# Patient Record
Sex: MALE | Race: WHITE | NOT HISPANIC OR LATINO
[De-identification: names, ages, dates, MRNs, and addresses within clinical notes are randomized per-mention and may not be internally consistent; named-entity substitution may affect disease eponyms.]

---

## 2017-03-22 PROBLEM — Z00.00 ENCOUNTER FOR PREVENTIVE HEALTH EXAMINATION: Status: ACTIVE | Noted: 2017-03-22

## 2017-04-03 ENCOUNTER — APPOINTMENT (OUTPATIENT)
Dept: DERMATOLOGY | Facility: CLINIC | Age: 72
End: 2017-04-03

## 2017-04-10 ENCOUNTER — APPOINTMENT (OUTPATIENT)
Dept: DERMATOLOGY | Facility: CLINIC | Age: 72
End: 2017-04-10

## 2021-02-11 ENCOUNTER — INPATIENT (INPATIENT)
Facility: HOSPITAL | Age: 76
LOS: 4 days | Discharge: NURSING, REHAB OR LONG-TERM CARE | DRG: 314 | End: 2021-02-16
Attending: FAMILY MEDICINE | Admitting: FAMILY MEDICINE
Payer: MEDICARE

## 2021-02-11 VITALS
RESPIRATION RATE: 18 BRPM | DIASTOLIC BLOOD PRESSURE: 63 MMHG | TEMPERATURE: 98 F | HEART RATE: 70 BPM | OXYGEN SATURATION: 96 % | SYSTOLIC BLOOD PRESSURE: 107 MMHG

## 2021-02-11 DIAGNOSIS — I95.9 HYPOTENSION, UNSPECIFIED: ICD-10-CM

## 2021-02-11 DIAGNOSIS — N18.6 END STAGE RENAL DISEASE: ICD-10-CM

## 2021-02-11 DIAGNOSIS — R41.82 ALTERED MENTAL STATUS, UNSPECIFIED: ICD-10-CM

## 2021-02-11 DIAGNOSIS — D64.9 ANEMIA, UNSPECIFIED: ICD-10-CM

## 2021-02-11 DIAGNOSIS — Z86.69 PERSONAL HISTORY OF OTHER DISEASES OF THE NERVOUS SYSTEM AND SENSE ORGANS: ICD-10-CM

## 2021-02-11 DIAGNOSIS — Z85.46 PERSONAL HISTORY OF MALIGNANT NEOPLASM OF PROSTATE: ICD-10-CM

## 2021-02-11 DIAGNOSIS — R77.8 OTHER SPECIFIED ABNORMALITIES OF PLASMA PROTEINS: ICD-10-CM

## 2021-02-11 LAB
ALBUMIN SERPL ELPH-MCNC: 2.7 G/DL — LOW (ref 3.3–5.2)
ALP SERPL-CCNC: 87 U/L — SIGNIFICANT CHANGE UP (ref 40–120)
ALT FLD-CCNC: 10 U/L — SIGNIFICANT CHANGE UP
ANION GAP SERPL CALC-SCNC: 18 MMOL/L — HIGH (ref 5–17)
APPEARANCE CSF: CLEAR — SIGNIFICANT CHANGE UP
APPEARANCE SPUN FLD: COLORLESS — SIGNIFICANT CHANGE UP
APTT BLD: 31.2 SEC — SIGNIFICANT CHANGE UP (ref 27.5–35.5)
AST SERPL-CCNC: 17 U/L — SIGNIFICANT CHANGE UP
BASOPHILS # BLD AUTO: 0.03 K/UL — SIGNIFICANT CHANGE UP (ref 0–0.2)
BASOPHILS NFR BLD AUTO: 0.4 % — SIGNIFICANT CHANGE UP (ref 0–2)
BILIRUB SERPL-MCNC: 0.4 MG/DL — SIGNIFICANT CHANGE UP (ref 0.4–2)
BUN SERPL-MCNC: 26 MG/DL — HIGH (ref 8–20)
CALCIUM SERPL-MCNC: 8.6 MG/DL — SIGNIFICANT CHANGE UP (ref 8.6–10.2)
CHLORIDE SERPL-SCNC: 92 MMOL/L — LOW (ref 98–107)
CO2 SERPL-SCNC: 23 MMOL/L — SIGNIFICANT CHANGE UP (ref 22–29)
COLOR CSF: SIGNIFICANT CHANGE UP
CREAT SERPL-MCNC: 4.44 MG/DL — HIGH (ref 0.5–1.3)
EOSINOPHIL # BLD AUTO: 0.03 K/UL — SIGNIFICANT CHANGE UP (ref 0–0.5)
EOSINOPHIL NFR BLD AUTO: 0.4 % — SIGNIFICANT CHANGE UP (ref 0–6)
GLUCOSE CSF-MCNC: 52 MG/DLG/24H — SIGNIFICANT CHANGE UP (ref 40–70)
GLUCOSE SERPL-MCNC: 99 MG/DL — SIGNIFICANT CHANGE UP (ref 70–99)
GRAM STN FLD: SIGNIFICANT CHANGE UP
HCT VFR BLD CALC: 23.7 % — LOW (ref 39–50)
HGB BLD-MCNC: 8.2 G/DL — LOW (ref 13–17)
IMM GRANULOCYTES NFR BLD AUTO: 0.6 % — SIGNIFICANT CHANGE UP (ref 0–1.5)
INR BLD: 1.05 RATIO — SIGNIFICANT CHANGE UP (ref 0.88–1.16)
LYMPHOCYTES # BLD AUTO: 2.55 K/UL — SIGNIFICANT CHANGE UP (ref 1–3.3)
LYMPHOCYTES # BLD AUTO: 30.5 % — SIGNIFICANT CHANGE UP (ref 13–44)
MCHC RBC-ENTMCNC: 32.3 PG — SIGNIFICANT CHANGE UP (ref 27–34)
MCHC RBC-ENTMCNC: 34.6 GM/DL — SIGNIFICANT CHANGE UP (ref 32–36)
MCV RBC AUTO: 93.3 FL — SIGNIFICANT CHANGE UP (ref 80–100)
MONOCYTES # BLD AUTO: 1.18 K/UL — HIGH (ref 0–0.9)
MONOCYTES NFR BLD AUTO: 14.1 % — HIGH (ref 2–14)
NEUTROPHILS # BLD AUTO: 4.51 K/UL — SIGNIFICANT CHANGE UP (ref 1.8–7.4)
NEUTROPHILS # CSF: SIGNIFICANT CHANGE UP % (ref 0–6)
NEUTROPHILS NFR BLD AUTO: 54 % — SIGNIFICANT CHANGE UP (ref 43–77)
NRBC NFR CSF: 2 /UL — SIGNIFICANT CHANGE UP (ref 0–5)
PLATELET # BLD AUTO: 326 K/UL — SIGNIFICANT CHANGE UP (ref 150–400)
POTASSIUM SERPL-MCNC: 4 MMOL/L — SIGNIFICANT CHANGE UP (ref 3.5–5.3)
POTASSIUM SERPL-SCNC: 4 MMOL/L — SIGNIFICANT CHANGE UP (ref 3.5–5.3)
PROT CSF-MCNC: 24 MG/DL — SIGNIFICANT CHANGE UP (ref 15–45)
PROT SERPL-MCNC: 5.9 G/DL — LOW (ref 6.6–8.7)
PROTHROM AB SERPL-ACNC: 12.2 SEC — SIGNIFICANT CHANGE UP (ref 10.6–13.6)
RAPID RVP RESULT: SIGNIFICANT CHANGE UP
RBC # BLD: 2.54 M/UL — LOW (ref 4.2–5.8)
RBC # CSF: 2 /CMM — HIGH (ref 0–1)
RBC # FLD: 15.3 % — HIGH (ref 10.3–14.5)
SARS-COV-2 RNA SPEC QL NAA+PROBE: SIGNIFICANT CHANGE UP
SODIUM SERPL-SCNC: 133 MMOL/L — LOW (ref 135–145)
SPECIMEN SOURCE: SIGNIFICANT CHANGE UP
TROPONIN T SERPL-MCNC: 0.08 NG/ML — HIGH (ref 0–0.06)
TUBE TYPE: SIGNIFICANT CHANGE UP
WBC # BLD: 8.35 K/UL — SIGNIFICANT CHANGE UP (ref 3.8–10.5)
WBC # FLD AUTO: 8.35 K/UL — SIGNIFICANT CHANGE UP (ref 3.8–10.5)

## 2021-02-11 PROCEDURE — 70496 CT ANGIOGRAPHY HEAD: CPT | Mod: 26

## 2021-02-11 PROCEDURE — 70498 CT ANGIOGRAPHY NECK: CPT | Mod: 26

## 2021-02-11 PROCEDURE — 93010 ELECTROCARDIOGRAM REPORT: CPT

## 2021-02-11 PROCEDURE — 71045 X-RAY EXAM CHEST 1 VIEW: CPT | Mod: 26

## 2021-02-11 PROCEDURE — 0042T: CPT

## 2021-02-11 PROCEDURE — 99291 CRITICAL CARE FIRST HOUR: CPT

## 2021-02-11 PROCEDURE — 99223 1ST HOSP IP/OBS HIGH 75: CPT

## 2021-02-11 RX ORDER — SEVELAMER CARBONATE 2400 MG/1
1 POWDER, FOR SUSPENSION ORAL
Qty: 0 | Refills: 0 | DISCHARGE

## 2021-02-11 RX ORDER — ALTEPLASE 100 MG
61.6 KIT INTRAVENOUS ONCE
Refills: 0 | Status: DISCONTINUED | OUTPATIENT
Start: 2021-02-11 | End: 2021-02-11

## 2021-02-11 RX ORDER — DRONABINOL 2.5 MG
0 CAPSULE ORAL
Qty: 0 | Refills: 0 | DISCHARGE

## 2021-02-11 RX ORDER — ASPIRIN/CALCIUM CARB/MAGNESIUM 324 MG
300 TABLET ORAL ONCE
Refills: 0 | Status: COMPLETED | OUTPATIENT
Start: 2021-02-11 | End: 2021-02-11

## 2021-02-11 RX ORDER — ACYCLOVIR SODIUM 500 MG
500 VIAL (EA) INTRAVENOUS ONCE
Refills: 0 | Status: COMPLETED | OUTPATIENT
Start: 2021-02-11 | End: 2021-02-11

## 2021-02-11 RX ORDER — VANCOMYCIN HCL 1 G
1000 VIAL (EA) INTRAVENOUS ONCE
Refills: 0 | Status: DISCONTINUED | OUTPATIENT
Start: 2021-02-11 | End: 2021-02-12

## 2021-02-11 RX ORDER — CEFTRIAXONE 500 MG/1
2000 INJECTION, POWDER, FOR SOLUTION INTRAMUSCULAR; INTRAVENOUS ONCE
Refills: 0 | Status: COMPLETED | OUTPATIENT
Start: 2021-02-11 | End: 2021-02-11

## 2021-02-11 RX ORDER — HALOPERIDOL DECANOATE 100 MG/ML
5 INJECTION INTRAMUSCULAR ONCE
Refills: 0 | Status: COMPLETED | OUTPATIENT
Start: 2021-02-11 | End: 2021-02-11

## 2021-02-11 RX ORDER — CEFTRIAXONE 500 MG/1
2000 INJECTION, POWDER, FOR SOLUTION INTRAMUSCULAR; INTRAVENOUS EVERY 12 HOURS
Refills: 0 | Status: DISCONTINUED | OUTPATIENT
Start: 2021-02-11 | End: 2021-02-11

## 2021-02-11 RX ORDER — ALTEPLASE 100 MG
6.8 KIT INTRAVENOUS ONCE
Refills: 0 | Status: DISCONTINUED | OUTPATIENT
Start: 2021-02-11 | End: 2021-02-11

## 2021-02-11 RX ORDER — HEPARIN SODIUM 5000 [USP'U]/ML
5000 INJECTION INTRAVENOUS; SUBCUTANEOUS EVERY 12 HOURS
Refills: 0 | Status: DISCONTINUED | OUTPATIENT
Start: 2021-02-12 | End: 2021-02-16

## 2021-02-11 RX ADMIN — Medication 300 MILLIGRAM(S): at 23:59

## 2021-02-11 RX ADMIN — HALOPERIDOL DECANOATE 5 MILLIGRAM(S): 100 INJECTION INTRAMUSCULAR at 19:16

## 2021-02-11 RX ADMIN — CEFTRIAXONE 100 MILLIGRAM(S): 500 INJECTION, POWDER, FOR SOLUTION INTRAMUSCULAR; INTRAVENOUS at 19:41

## 2021-02-11 RX ADMIN — Medication 110 MILLIGRAM(S): at 23:02

## 2021-02-11 RX ADMIN — Medication 2 MILLIGRAM(S): at 16:28

## 2021-02-11 RX ADMIN — Medication 1 DROP(S): at 23:02

## 2021-02-11 RX ADMIN — Medication 2 MILLIGRAM(S): at 19:16

## 2021-02-11 NOTE — H&P ADULT - PROBLEM SELECTOR PLAN 4
No old labs for comparison, Hb 8.2 , likely related to his CKD, will send anemia work up and close f/u of labs. trop 0.08 possible renally related due to elevated Cr, but will trend, echo and cardiology [place]. trop 0.08 possible renally related due to elevated Cr, but will trend, echo and cardiology consult [place] called.

## 2021-02-11 NOTE — H&P ADULT - PROBLEM SELECTOR PLAN 1
Etiology ? TIA ? will admit to stroke unit for now, stroke protocol, neuro checks, mri brain, echo, carotid doppler. neurology follow up. npo at this point as pt. is sedated and was altered. swallow eval.

## 2021-02-11 NOTE — CONSULT NOTE ADULT - SUBJECTIVE AND OBJECTIVE BOX
[facility]                                     Neurology at [place]                                 Tony Crump, & Gigi                                  [address]                                        [place], [place], [address]                                             [phone]    CC: AMS  HPI: The patient is a 75y Male who presented with hypotension and  AMS following hemodialysis this afternoon.  During transport to [facility], EMS noticed a left facial droop and code stroke was activated. Estimated last know well is approximately 1600.  Upon speaking with his wife, Dr Ram in The ED found out that he has had this facial droop since December when he had [place] palsy.  He has dementia and needed ativan for the CT head.  Neurology is asked to     PAST MEDICAL & SURGICAL HISTORY:  dementias  ESRD on HD    MEDICATIONS  (STANDING):    MEDICATIONS  (PRN):      Allergies    No Known Allergies    Intolerances        SOCIAL HISTORY:  unable to obtain    FAMILY HISTORY:  unable to obtain      ROS: 14 point ROS negative other than what is present in HPI or below   dementia, sedated lightly    Vital Signs Last 24 Hrs  T(C): 36.8 (11 Feb 2021 16:08), Max: 36.8 (11 Feb 2021 16:08)  T(F): 98.2 (11 Feb 2021 16:08), Max: 98.2 (11 Feb 2021 16:08)  HR: 70 (11 Feb 2021 16:08) (70 - 70)  BP: 107/63 (11 Feb 2021 16:08) (107/63 - 107/63)  BP(mean): --  RR: 18 (11 Feb 2021 16:08) (18 - 18)  SpO2: 96% (11 Feb 2021 16:08) (96% - 96%)      General: NAD    Detailed Neurologic Exam:    Mental status: The patient is sedated, non verbal, not following commands.  unable to assess orientation.     Cranial nerves: Pupils equal and react symmetrically to light. There is no visual field deficit to threat. Extraocular motion is full with tracking. There is no ptosis. Facial sensation is intact. Facial musculature is asymmetric, peripheral left facial weakness.    Motor: There is normal bulk and increased tone.  There is no tremor. He is restless and grabbning at things.  not participating in formal strength testing but moves 4 ext equally well      Sensation: Intact to pinch in 4 extremities    Reflexes: 1+ throughout and plantar responses are flexor.    Cerebellar: Unable to assess finger to nose testing.    Gait : deferred    NIH SS: artificially elevated due to dementia and sedation  DATE: 2/11/2021  TIME: 1700  1A: Level of consciousness (0-3): 0  1B: Questions (0-2): 2  1C: Commands (0-2): 2  2: Gaze (0-2): 0  3: Visual fields (0-3): 0  4: Facial palsy (0-3): 1  MOTOR:  5A: Left arm motor drift (0-4): 1  5B: Right arm motor drift (0-4): 1  6A: Left leg motor drift (0-4): 1  6B: Right leg motor drift (0-4): 1  7: Limb ataxia (0-2): 0  SENSORY:  8: Sensation (0-2): 0  SPEECH:  9: Language (0-3): 2  10: Dysarthria (0-2): 1  EXTINCTION:  11: Extinction/inattention (0-2): 0    TOTAL SCORE: 12          LABS:                         8.2    8.35  )-----------( 326      ( 11 Feb 2021 16:24 )             23.7       02-11    133<L>  |  92<L>  |  26.0<H>  ----------------------------<  99  4.0   |  23.0  |  4.44<H>    Ca    8.6      11 Feb 2021 16:24    TPro  5.9<L>  /  Alb  2.7<L>  /  TBili  0.4  /  DBili  x   /  AST  17  /  ALT  10  /  AlkPhos  87  02-11      PT/INR - ( 11 Feb 2021 16:24 )   PT: 12.2 sec;   INR: 1.05 ratio         PTT - ( 11 Feb 2021 16:24 )  PTT:31.2 sec    RADIOLOGY & ADDITIONAL STUDIES (independently reviewed unless otherwise noted):  CT head: no acute CVA, mass or blood  CTA head: motion artifact, poor contrast timing non diagnostic  CTA neck: no sig carotid or vertebral stenosis but motion degraded  CT Perfusion head - CBF<30% volume 0ml, Tmax>6s volume =0ml

## 2021-02-11 NOTE — H&P ADULT - PROBLEM SELECTOR PLAN 3
pt's nephrologist Dr. Carter service called as pt. got iv contrast and will need dialysis, dialysis plan tonight or tomorrow as per nephrologist.

## 2021-02-11 NOTE — H&P ADULT - PROBLEM SELECTOR PLAN 5
At baseline per history, will order artifical tear for his left eye. No old labs for comparison, Hb 8.2 , likely related to his CKD, will send anemia work up and close f/u of labs.

## 2021-02-11 NOTE — ED PROVIDER NOTE - PHYSICAL EXAMINATION
General: well appearing, not following commands  HEENT: left eye lid open, left side facial droop, pupils equal and reactive, normal external ears bilaterally   Cardiac: RRR, no MRG appreciated  Resp: lungs clear to auscultation bilaterally, symmetric chest wall rise  Abd: soft, nontender, nondistended,   : no CVA tenderness  Neuro: Moving all extremities  Skin:  normal color for race

## 2021-02-11 NOTE — ED ADULT TRIAGE NOTE - CHIEF COMPLAINT QUOTE
Patient BIBA to ED today after receiving hemodialysis he started to have right sided facial droop, patient uncooperative, unable to follow commands.  Dr. Ram at bedside, code stroke called.

## 2021-02-11 NOTE — ED PROVIDER NOTE - OBJECTIVE STATEMENT
Pt is a 74 y/o s/p dialysis presents c/o code stroke.  Left side facial droop. Pt is a 74 y/o s/p dialysis presents c/o code stroke.  Left side facial droop.  Pt was BIBEMS c/o hypotension and AMS after completing dialysis.  Enroute EMS noticed a left sided facial droop.  At time of presentation pt was not able to answer questions, stroke team activated.  Per wife, pt has hx of [place] Palsy since December.  Was discharged from [facility] after a week stay c/o weakness and not eating for a month.  She states that he seems to be continually declining.  He completed dialysis today and she states that she was unable to recognize her.

## 2021-02-11 NOTE — H&P ADULT - HISTORY OF PRESENT ILLNESS
pt. is a 75y Male who is on HD as of November 2020 and also got renal stents b/L during that period by Dr. Crabtree ( as per pt's wife ) presented with hypotension and  AMS following hemodialysis this afternoon. Pt. completed his dialysis and was waiting to be picked up by his wife when his symptoms of confusion and hypotension developed. During transport to [facility], EMS noticed a left facial droop and code stroke was activated. Later ER physician spoke to pt's wife who stated that pt. had Bell's palsy in December 2020 and his facial droop is from that and not a new finding. I have spoke to pt's wife as well who is at the bedside at the time of admission and told me same thing about his facial droop as well. Pt. was evaluated by neurologist Dr. Bonilla in the ER who thinks it is likely not a stroke and no alteplase was recommended. As per pt's wife he was discharged on Feb, 9th , 2021 from [facility] after he was admitted there for 1 week for generalized weakness and decreased po intake. pt. was discharged with Dronabinol. Pt. has been covid -19 positive. no sick person at home. no fever at home, pt. afebrile in the ED as well. pt. was sedated to get his ct head, got iv contrast and later got LP by ER team and was sedated with ativan and haldol. Pt. sedated at the time of admission. Pt. has not been giving much history as per ER team. There has been no n/v/d. cp, sob per history.

## 2021-02-11 NOTE — ED ADULT NURSE REASSESSMENT NOTE - NS ED NURSE REASSESS COMMENT FT1
assumed pt care from critical care RN. Pt at baseline as per wife at bedside. Pt in NAD, respirations even & unlabored. NSR on monitor. Safety maintained. WIll continue to monitor.
unable to do full neuro exam as pt is unable to participate.

## 2021-02-11 NOTE — H&P ADULT - NSHPPHYSICALEXAM_GEN_ALL_CORE
General: Pt. lying in bed , somewhat sedated at the time of admission but periodically moves his upper / lower ext.   HEENT: AT, NC. PERRL. left eye patch noted, pt. has that one since he got bells palsy and gets dry eye. no throat erythema or exudate.   Neck: supple. no JVD.   Chest: CTA bilaterally, rt. upper CW dialysis catheter noted, intact / clean.   Heart: S1,S2. RRR. no heart murmur. no edema.   Abdomen: soft. does not appear in pain on abd. exam. non-distended. + BS.   Ext: no calf swelling on either side noted. distal pulses 2 +.   Neuro: pt. is somewhat sedated at the time of admission ( got haldol and ativan for LP ) but periodically moves his upper / lower ext. limited exam at this point due to sedation.  Skin: no rash noted, no diaphoresis, no cyanosis.   Psych : no agitation, pt. sedated. General: Pt. lying in bed , somewhat sedated at the time of admission but periodically moves his upper / lower ext.   HEENT: AT, NC. PERRL. left eye patch noted, pt. has that one since he got bells palsy and gets dry eye. left facial droop, no throat erythema or exudate.   Neck: supple. no JVD.   Chest: CTA bilaterally, rt. upper CW dialysis catheter noted, intact / clean.   Heart: S1,S2. RRR. no heart murmur. no edema.   Abdomen: soft. does not appear in pain on abd. exam. non-distended. + BS.   Ext: no calf swelling on either side noted. distal pulses 2 +.   Neuro: pt. is somewhat sedated at the time of admission ( got haldol and ativan for LP ) but periodically moves his upper / lower ext. limited exam at this point due to sedation. Motor: There is normal bulk and increased tone.  There is no tremor. Sensation: Intact to pinch in 4 extremities. Reflexes: 1+ throughout and plantar responses are flexor. Cerebellar: Unable to assess finger to nose testing.  Skin: no rash noted, no diaphoresis, no cyanosis.   Psych : no agitation, pt. sedated.

## 2021-02-11 NOTE — H&P ADULT - ASSESSMENT
In summary pt. is a 75y Male who is on HD as of November 2020 and also got renal stents b/L during that period by Dr. Crabtree ( as per pt's wife ) presented with hypotension and  AMS following hemodialysis this afternoon. Pt. completed his dialysis and was waiting to be picked up by his wife when his symptoms of confusion and hypotension developed. During transport to [facility], EMS noticed a left facial droop and code stroke was activated. Later ER physician spoke to pt's wife who stated that pt. had Bell's palsy in December 2020 and his facial droop is from that and not a new finding. I have spoke to pt's wife as well who is at the bedside at the time of admission and told me same thing about his facial droop as well. Pt. was evaluated by neurologist Dr. Bonilla in the ER who thinks it is likely not a stroke and no alteplase was recommended. As per pt's wife he was discharged on Feb, 9th , 2021 from [facility] after he was admitted there for 1 week for generalized weakness and decreased po intake. pt. was discharged with Dronabinol. Pt. has been covid -19 positive. no sick person at home. no fever at home, pt. afebrile in the ED as well. pt. was sedated to get his ct head, got iv contrast and later got LP by ER team and was sedated with ativan and haldol. Pt. sedated at the time of admission. Pt. has not been giving much history as per ER team. There has been no n/v/d. cp, sob per history. pt. will be admitted for AMS, TIA ? will be admit to stroke unit , stroke work up including MR brain, pt. is not hypotensive in the ER.

## 2021-02-11 NOTE — H&P ADULT - NSICDXPASTMEDICALHX_GEN_ALL_CORE_FT
PAST MEDICAL HISTORY:  Chronic kidney disease (CKD) on HD as of Novemeber, 2020. had  b/L renal stents in nov, 2020 .    H/O Bell's palsy     Prostate CA as per wife had radiation treatment in 2018.

## 2021-02-11 NOTE — ED PROVIDER NOTE - ATTENDING CONTRIBUTION TO CARE
The patient seen and examined    AMS    I, Davon Ram, performed the initial face to face bedside interview with this patient regarding history of present illness, review of symptoms and relevant past medical, social and family history.  I completed an independent physical examination.  I was the initial provider who evaluated this patient. I have signed out the follow up of any pending tests (i.e. labs, radiological studies) to the resident.  I have communicated the patient’s plan of care and disposition with the resident.

## 2021-02-11 NOTE — H&P ADULT - PROBLEM SELECTOR PLAN 7
pt. had radiation in 2018 as per wife. PT'S CT NECK REPORTED 1.4 CM RT. LUNG  NODULAR OPACITY FOR FURTHER EVALUATION CT CHEST ORDERED TO BE FOLLOWED.

## 2021-02-11 NOTE — CONSULT NOTE ADULT - SUBJECTIVE AND OBJECTIVE BOX
75yM was admitted on 02-11    In ED, GCS=    Patient is a 75y old  Male who presents with a chief complaint of AMS (11 Feb 2021 22:09)    HPI:  pt. is a 75y Male who is on HD as of November 2020 and also got renal stents b/L during that period by Dr. Crabtree ( as per pt's wife ) presented with hypotension and  AMS following hemodialysis this afternoon. Pt. completed his dialysis and was waiting to be picked up by his wife when his symptoms of confusion and hypotension developed. During transport to [facility], EMS noticed a left facial droop and code stroke was activated. Later ER physician spoke to pt's wife who stated that pt. had Bell's palsy in December 2020 and his facial droop is from that and not a new finding. I have spoke to pt's wife as well who is at the bedside at the time of admission and told me same thing about his facial droop as well. Pt. was evaluated by neurologist Dr. Bonilla in the ER who thinks it is likely not a stroke and no alteplase was recommended. As per pt's wife he was discharged on Feb, 9th , 2021 from [facility] after he was admitted there for 1 week for generalized weakness and decreased po intake. pt. was discharged with Dronabinol. Pt. has been covid -19 positive. no sick person at home. no fever at home, pt. afebrile in the ED as well. pt. was sedated to get his ct head, got iv contrast and later got LP by ER team and was sedated with ativan and haldol. Pt. sedated at the time of admission. Pt. has not been giving much history as per ER team. There has been no n/v/d. cp, sob per history.   (11 Feb 2021 19:17)      Imaging showed:  Brain CT without contrast 2/11:  Motion degraded exam. No evidence of intracranial hemorrhage or mass effect. If clinical suspicion for acute infarct persists, brain MRI can be obtained if there is no contraindication.    CT perfusion 2/11:  No evidence of core infarct.    CT angiography neck 2/11:  1.  Very limited, motion degraded exam. Portions of the bilateral vertebral arteries and ICAs are not well visualized due to artifact. No gross evidence of vascular occlusion. No evidence of hemodynamically significant stenosis of the bilateral cervical ICAs using NASCET criteria.  2.  1.4 cm nodular opacity in the right lung. Recommend CT chest.    CT angiography brain 2/11: Nondiagnostic due to motion.      REVIEW OF SYSTEMS  Constitutional - No fever, No weight loss, No fatigue  HEENT - No eye pain, No visual disturbances, No difficulty hearing, No tinnitus, No vertigo, No neck pain  Respiratory - No cough, No wheezing, No shortness of breath  Cardiovascular - No chest pain, No palpitations  Gastrointestinal - No abdominal pain, No nausea, No vomiting, No diarrhea, No constipation  Genitourinary - No dysuria, No frequency, No hematuria, No incontinence  Neurological - No headaches, No memory loss, No loss of strength, No numbness, No tremors  Skin - No itching, No rashes, No lesions   Endocrine - No temperature intolerance  Musculoskeletal - No joint pain, No joint swelling, No muscle pain  Psychiatric - No depression, No anxiety    VITALS  T(C): 36.7 (02-11-21 @ 18:02), Max: 36.8 (02-11-21 @ 16:08)  HR: 91 (02-11-21 @ 19:34) (70 - 96)  BP: 149/93 (02-11-21 @ 19:34) (107/63 - 149/93)  RR: 16 (02-11-21 @ 19:34) (16 - 20)  SpO2: 100% (02-11-21 @ 19:34) (96% - 100%)  Wt(kg): --    PAST MEDICAL & SURGICAL HISTORY  Prostate CA    H/O Bell&#x27;s palsy    Chronic kidney disease (CKD)    No significant past surgical history        SOCIAL HISTORY - as per documentation/history  Smoking - None  EtOH - None  Drugs - None    FUNCTIONAL HISTORY  Lives with wife  Independent PTA    CURRENT FUNCTIONAL STATUS      FAMILY HISTORY   FH: breast cancer        RECENT LABS/IMAGING  CBC Full  -  ( 11 Feb 2021 16:24 )  WBC Count : 8.35 K/uL  RBC Count : 2.54 M/uL  Hemoglobin : 8.2 g/dL  Hematocrit : 23.7 %  Platelet Count - Automated : 326 K/uL  Mean Cell Volume : 93.3 fl  Mean Cell Hemoglobin : 32.3 pg  Mean Cell Hemoglobin Concentration : 34.6 gm/dL  Auto Neutrophil # : 4.51 K/uL  Auto Lymphocyte # : 2.55 K/uL  Auto Monocyte # : 1.18 K/uL  Auto Eosinophil # : 0.03 K/uL  Auto Basophil # : 0.03 K/uL  Auto Neutrophil % : 54.0 %  Auto Lymphocyte % : 30.5 %  Auto Monocyte % : 14.1 %  Auto Eosinophil % : 0.4 %  Auto Basophil % : 0.4 %    02-11    133<L>  |  92<L>  |  26.0<H>  ----------------------------<  99  4.0   |  23.0  |  4.44<H>    Ca    8.6      11 Feb 2021 16:24    TPro  5.9<L>  /  Alb  2.7<L>  /  TBili  0.4  /  DBili  x   /  AST  17  /  ALT  10  /  AlkPhos  87  02-11        ALLERGIES  No Known Allergies      MEDICATIONS   acyclovir IVPB 500 milliGRAM(s) IV Intermittent once  artificial tears (preservative free) Ophthalmic Solution 1 Drop(s) Left EYE four times a day  aspirin Suppository 300 milliGRAM(s) Rectal once  vancomycin  IVPB 1000 milliGRAM(s) IV Intermittent once       75yM was admitted on 02-11 with AMS. He was recently discharged from an OSH for weakness and decrease PO.      Imaging showed:  Brain CT 2/11 - Motion degraded exam. No evidence of intracranial hemorrhage or mass effect. If clinical suspicion for acute infarct persists, brain MRI can be obtained if there is no contraindication.    CT perfusion 2/11 - No evidence of core infarct.    CTA neck 2/11 1.  Very limited, motion degraded exam. Portions of the bilateral vertebral arteries and ICAs are not well visualized due to artifact. No gross evidence of vascular occlusion. No evidence of hemodynamically significant stenosis of the bilateral cervical ICAs using NASCET criteria. 2.  1.4 cm nodular opacity in the right lung. Recommend CT chest.    CTA brain 2/11 - Nondiagnostic due to motion.    CAROTID US 2/12 - Moderate plaque without a hemodynamic abnormality.    Patient states he feels fine and he feels he is at his baseline.   He reports no pain.     REVIEW OF SYSTEMS  Constitutional - No fever, No weight loss, No fatigue  HEENT - No eye pain, No visual disturbances, No difficulty hearing, No tinnitus, No vertigo, No neck pain, +Left facial droop  Respiratory - No cough, No wheezing, No shortness of breath  Cardiovascular - No chest pain, No palpitations  Gastrointestinal - No abdominal pain, No nausea, No vomiting, No diarrhea, No constipation  Genitourinary - No dysuria, No frequency, No hematuria, No incontinence  Neurological - No headaches, No memory loss, No loss of strength, No numbness, No tremors  Skin - No itching, No rashes, No lesions   Endocrine - No temperature intolerance  Musculoskeletal - No joint pain, No joint swelling, No muscle pain  Psychiatric - No depression, No anxiety    VITALS  T(C): 36.7 (02-11-21 @ 18:02), Max: 36.8 (02-11-21 @ 16:08)  HR: 91 (02-11-21 @ 19:34) (70 - 96)  BP: 149/93 (02-11-21 @ 19:34) (107/63 - 149/93)  RR: 16 (02-11-21 @ 19:34) (16 - 20)  SpO2: 100% (02-11-21 @ 19:34) (96% - 100%)  Wt(kg): --    PAST MEDICAL & SURGICAL HISTORY  Prostate CA    H/O Bell&#x27;s palsy    Chronic kidney disease (CKD)    No significant past surgical history        SOCIAL HISTORY - as per documentation/history  Smoking - None  EtOH - None  Drugs - None    FUNCTIONAL HISTORY  Lives with wife  Independent PTA    CURRENT FUNCTIONAL STATUS  2/12  Bed Mobility: Rolling/Turning:     · Level of [place]	minimum assist (75% patients effort)  · Physical Assist/Nonphysical Assist	1 person assist    Bed Mobility: Scooting/Bridging:     · Level of [place]	minimum assist (75% patients effort)  · Physical Assist/Nonphysical Assist	1 person assist    Bed Mobility: Sit to Supine:     · Level of [place]	minimum assist (75% patients effort)  · Physical Assist/Nonphysical Assist	1 person assist    Bed Mobility: Supine to Sit:     · Level of [place]	moderate assist (50% patients effort)  · Physical Assist/Nonphysical Assist	1 person assist    Bed Mobility Analysis:     · Bed Mobility Limitations	impaired ability to control trunk for mobility; decreased ability to use legs for bridging/pushing  · Impairments Contributing to Impaired Bed Mobility	impaired balance; impaired postural control; decreased strength; lethargy    Transfer: Sit to Stand:     · Level of [place]	minimum assist (75% patients effort)  · Physical Assist/Nonphysical Assist	1 person assist  · Weight-Bearing Restrictions	weight-bearing as tolerated  · Assistive Device	rolling walker    Transfer: Stand to Sit:     · Level of [place]	minimum assist (75% patients effort)  · Physical Assist/Nonphysical Assist	1 person assist  · Weight-Bearing Restrictions	weight-bearing as tolerated  · Assistive Device	rolling walker    Sit/Stand Transfer Safety Analysis:     · Transfer Safety Concerns Noted	decreased safety awareness; losing balance; decreased weight-shifting ability; decreased sequencing ability  · Impairments Contributing to Impaired Transfers	impaired balance; impaired postural control; decreased strength; impaired coordination; cognition    Gait Skills:     · Level of [place]	minimum assist (75% patients effort)  · Physical Assist/Nonphysical Assist	1 person assist  · Weight-Bearing Restrictions	weight-bearing as tolerated  · Assistive Device	rolling walker  · Gait Distance	3 side steps to left at EOB    Gait Analysis:     · Gait Pattern Used	3-point gait  · Gait Deviations Noted	decreased nick; increased time in double stance; decreased step length; decreased weight-shifting ability  · Impairments Contributing to Gait Deviations	impaired balance; impaired coordination; impaired postural control; decreased strength; cognition    Stair Negotiation:     · Level of [place]	unable to perform; unsafe at this time      FAMILY HISTORY   FH: breast cancer        RECENT LABS/IMAGING  CBC Full  -  ( 11 Feb 2021 16:24 )  WBC Count : 8.35 K/uL  RBC Count : 2.54 M/uL  Hemoglobin : 8.2 g/dL  Hematocrit : 23.7 %  Platelet Count - Automated : 326 K/uL  Mean Cell Volume : 93.3 fl  Mean Cell Hemoglobin : 32.3 pg  Mean Cell Hemoglobin Concentration : 34.6 gm/dL  Auto Neutrophil # : 4.51 K/uL  Auto Lymphocyte # : 2.55 K/uL  Auto Monocyte # : 1.18 K/uL  Auto Eosinophil # : 0.03 K/uL  Auto Basophil # : 0.03 K/uL  Auto Neutrophil % : 54.0 %  Auto Lymphocyte % : 30.5 %  Auto Monocyte % : 14.1 %  Auto Eosinophil % : 0.4 %  Auto Basophil % : 0.4 %    02-11    133<L>  |  92<L>  |  26.0<H>  ----------------------------<  99  4.0   |  23.0  |  4.44<H>    Ca    8.6      11 Feb 2021 16:24    TPro  5.9<L>  /  Alb  2.7<L>  /  TBili  0.4  /  DBili  x   /  AST  17  /  ALT  10  /  AlkPhos  87  02-11        ALLERGIES  No Known Allergies      MEDICATIONS   acyclovir IVPB 500 milliGRAM(s) IV Intermittent once  artificial tears (preservative free) Ophthalmic Solution 1 Drop(s) Left EYE four times a day  aspirin Suppository 300 milliGRAM(s) Rectal once  vancomycin  IVPB 1000 milliGRAM(s) IV Intermittent once        ----------------------------------------------------------------------------------------  PHYSICAL EXAM  Constitutional - NAD, Comfortable  HEENT - NCAT, Left eye patch  Neck - Supple, No limited ROM  Chest - Breathing comfortably, No wheezing  Cardiovascular - S1S2   Abdomen - Soft   Extremities - No C/C/E, No calf tenderness   Neurologic Exam -                    Cognitive - Awake, Alert, AAO to self, NOT situation      Communication - +dysarthria     Cranial Nerves - Left peripheral CN 7      Motor - No focal deficits                    LEFT    UE - ShAB 5/5, EF 5/5, EE 5/5, WE 5/5,  5/5                    RIGHT UE - ShAB 5/5, EF 5/5, EE 5/5, WE 5/5,  5/5                    LEFT    LE - HF 5/5, KE 5/5, DF 5/5, PF 5/5                    RIGHT LE - HF 5/5, KE 5/5, DF 5/5, PF 5/5        Sensory - Intact to LT  Psychiatric - Mood stable, Affect Flat  ----------------------------------------------------------------------------------------    ASSESSMENT/PLAN  75yMale with functional deficits after AMS  R/O CVA - MRI pending  HTN - Hydralazine  DVT PPX - SCDs, Heparin  Rehab - Workup pending. Will continue to follow.     Continue bedside therapy as well as OOB throughout the day with mobilization by staff to maintain cardiopulmonary function and prevention of secondary complications related to debility.     Discussed with rehab team.

## 2021-02-11 NOTE — CONSULT NOTE ADULT - SUBJECTIVE AND OBJECTIVE BOX
[facility]                                                        Office: [address], [place] [address]                                                       Telephone: [phone]. Fax:[phone]                                                              CARDIOLOGY CONSULTATION NOTE                                                                                                 History obtained by: Patient and medical record     obtained: No    Chief complaint: AMS    HPI: 75y Male who is on HD as of November 2020 and also got renal stents b/L during that period by Dr. Crabtree ( as per pt's wife ) presented with hypotension and  AMS following hemodialysis this afternoon. Pt. completed his dialysis and was waiting to be picked up by his wife when his symptoms of confusion and hypotension developed. During transport to [facility], EMS noticed a left facial droop and code stroke was activated. Later ER physician spoke to pt's wife who stated that pt. had Bell's palsy in December 2020 and his facial droop is from that and not a new finding. I have spoke to pt's wife as well who is at the bedside at the time of admission and told me same thing about his facial droop as well. Pt. was evaluated by neurologist Dr. Bonilla in the ER who thinks it is likely not a stroke and no alteplase was recommended. As per pt's wife he was discharged on Feb, 9th , 2021 from [facility] after he was admitted there for 1 week for generalized weakness and decreased po intake. pt. was discharged with Dronabinol. Pt. has been covid -19 positive. no sick person at home. no fever at home, pt. afebrile in the ED as well. pt. was sedated to get his ct head, got iv contrast and later got LP by ER team and was sedated with ativan and haldol. Pt. sedated at the time of admission. Pt. has not been giving much history as per ER team. There has been no n/v/d. cp, sob per history.    REVIEW OF SYMPTOMS: Cardiovascular:  See HPI. No chest pain,  No dyspnea,  No syncope,  No palpitations, No dizziness, No Orthopnea,      No Paroxsymal nocturnal dyspnea;  Respiratory:  No Dyspnea, No cough,     Genitourinary:  No dysuria, no hematuria; Gastrointestinal:  No nausea, no vomiting. No diarrhea.  No abdominal pain. No dark color stool, no melena ; Neurological: No headache, no dizziness, no slurred speech;  Psychiatric: No agitation, no anxiety.  ALL OTHER REVIEW OF SYSTEMS ARE NEGATIVE.    ALLERGIES: No Known Allergies    CURRENT MEDICATIONS:  aspirin Suppository  acyclovir IVPB  artificial tears (preservative free) Ophthalmic Solution  vancomycin  IVPB    HOME MEDICATIONS:    PAST MEDICAL HISTORY  Prostate CA  H/O Bell&#x27;s palsy  Chronic kidney disease (CKD)    PAST SURGICAL HISTORY  No significant past surgical history    FAMILY HISTORY:  FH: breast cancer  mother    SOCIAL HISTORY:  Denies smoking/alcohol/drugs    Vital Signs Last 24 Hrs  T(C): 36.7 (11 Feb 2021 18:02), Max: 36.8 (11 Feb 2021 16:08)  T(F): 98 (11 Feb 2021 18:02), Max: 98.2 (11 Feb 2021 16:08)  HR: 91 (11 Feb 2021 19:34) (70 - 96)  BP: 149/93 (11 Feb 2021 19:34) (107/63 - 149/93)  BP(mean): --  RR: 16 (11 Feb 2021 19:34) (16 - 20)  SpO2: 100% (11 Feb 2021 19:34) (96% - 100%)    PHYSICAL EXAM:  Constitutional: Comfortable . No acute distress.   HEENT: Atraumatic and normcephalic , neck is supple . no JVD. No carotid bruit. PEERL   CNS: A&Ox3. No focal deficits. EOMI. Cranial nerves II-IX are intact.   Lymph Nodes: Cervical : Not palpable.  Respiratory: CTAB  Cardiovascular: S1S2 RRR. No murmur/rubs or gallop.  Gastrointestinal: Soft non-tender and non distended . +Bowel sounds. negative Sykes's sign.  Extremities: No edema.   Psychiatric: Calm . no agitation.  Skin: No skin rash/ulcers visualized to face, hands or feet.    Intake and output:     LABS:                        8.2    8.35  )-----------( 326      ( 11 Feb 2021 16:24 )             23.7     02-11    133<L>  |  92<L>  |  26.0<H>  ----------------------------<  99  4.0   |  23.0  |  4.44<H>    Ca    8.6      11 Feb 2021 16:24    TPro  5.9<L>  /  Alb  2.7<L>  /  TBili  0.4  /  DBili  x   /  AST  17  /  ALT  10  /  AlkPhos  87  02-11    CARDIAC MARKERS ( 11 Feb 2021 16:24 )  x     / 0.08 ng/mL / x     / x     / x        PT: 12.2 sec;   INR: 1.05 ratio  PTT:31.2 sec    INTERPRETATION OF TELEMETRY: Reviewed by me.   ECG:     RADIOLOGY & ADDITIONAL STUDIES:    X-ray:  reviewed by me.   CT scan:   MRI:   ECHO FINDINGS: Date:                : LVEF=          ; RV function:       ; Valvular abnormalities: No significant valvular abnormality.  Mitral valve:           ;  Aortic valve:              ;Tricuspid valve:         ; Pulmonary pressures:        mm Hg. Pericardium:      STRESS  FINDINGS: Date:            ;      CATHETERIZATION FINDINGS:  Date:              :  LAD:                       ;  LCx:                        ; RCA:                ;

## 2021-02-11 NOTE — ED ADULT NURSE NOTE - OBJECTIVE STATEMENT
Patient BIBA to ED today after receiving hemodialysis he started to have right sided facial droop, patient uncooperative, unable to follow commands.  Dr. Ram at bedside, code stroke called.- as  per Triage note. unable to obtain any information from the patient. Pts wife states he was  in GS last weakness . Pt was diagnosed back in December with [place] Palsy - baseline dementia

## 2021-02-11 NOTE — H&P ADULT - PROBLEM SELECTOR PLAN 2
As per history became hypotensive after dialysis, possible dialysis and volume removal related hypotension ? infection ? pt's rectal temp 98 in the ER, no obvious source or signs of infection, pt. had LP in the ER, gram stain few wbc, no organism, color clear. pt. given rocephin, vanco, acyclovir , one dose each in the ER, follow CSF culture, blood culture, will request ID consult if further antibiotics or antiviral needed.

## 2021-02-12 LAB
A1C WITH ESTIMATED AVERAGE GLUCOSE RESULT: 4.6 % — SIGNIFICANT CHANGE UP (ref 4–5.6)
ANION GAP SERPL CALC-SCNC: 10 MMOL/L — SIGNIFICANT CHANGE UP (ref 5–17)
BASOPHILS # BLD AUTO: 0.02 K/UL — SIGNIFICANT CHANGE UP (ref 0–0.2)
BASOPHILS NFR BLD AUTO: 0.3 % — SIGNIFICANT CHANGE UP (ref 0–2)
BLD GP AB SCN SERPL QL: SIGNIFICANT CHANGE UP
BUN SERPL-MCNC: 31 MG/DL — HIGH (ref 8–20)
CALCIUM SERPL-MCNC: 8.5 MG/DL — LOW (ref 8.6–10.2)
CHLORIDE SERPL-SCNC: 93 MMOL/L — LOW (ref 98–107)
CHOLEST SERPL-MCNC: 139 MG/DL — SIGNIFICANT CHANGE UP
CO2 SERPL-SCNC: 29 MMOL/L — SIGNIFICANT CHANGE UP (ref 22–29)
CREAT SERPL-MCNC: 5.42 MG/DL — HIGH (ref 0.5–1.3)
CSF PCR RESULT: SIGNIFICANT CHANGE UP
EOSINOPHIL # BLD AUTO: 0.03 K/UL — SIGNIFICANT CHANGE UP (ref 0–0.5)
EOSINOPHIL NFR BLD AUTO: 0.4 % — SIGNIFICANT CHANGE UP (ref 0–6)
ESTIMATED AVERAGE GLUCOSE: 85 MG/DL — SIGNIFICANT CHANGE UP (ref 68–114)
FERRITIN SERPL-MCNC: 1559 NG/ML — HIGH (ref 30–400)
GLUCOSE SERPL-MCNC: 90 MG/DL — SIGNIFICANT CHANGE UP (ref 70–99)
HCT VFR BLD CALC: 22.5 % — LOW (ref 39–50)
HDLC SERPL-MCNC: 34 MG/DL — LOW
HGB BLD-MCNC: 7.4 G/DL — LOW (ref 13–17)
IMM GRANULOCYTES NFR BLD AUTO: 0.9 % — SIGNIFICANT CHANGE UP (ref 0–1.5)
IRON SATN MFR SERPL: 33 % — SIGNIFICANT CHANGE UP (ref 16–55)
IRON SATN MFR SERPL: 59 UG/DL — SIGNIFICANT CHANGE UP (ref 59–158)
LABORATORY COMMENT REPORT: SIGNIFICANT CHANGE UP
LDH CSF L TO P-CCNC: 20 U/L — SIGNIFICANT CHANGE UP
LDH FLD-CCNC: 20 U/L — SIGNIFICANT CHANGE UP
LIPID PNL WITH DIRECT LDL SERPL: 86 MG/DL — SIGNIFICANT CHANGE UP
LYMPHOCYTES # BLD AUTO: 1.09 K/UL — SIGNIFICANT CHANGE UP (ref 1–3.3)
LYMPHOCYTES # BLD AUTO: 14.7 % — SIGNIFICANT CHANGE UP (ref 13–44)
MCHC RBC-ENTMCNC: 31.8 PG — SIGNIFICANT CHANGE UP (ref 27–34)
MCHC RBC-ENTMCNC: 32.9 GM/DL — SIGNIFICANT CHANGE UP (ref 32–36)
MCV RBC AUTO: 96.6 FL — SIGNIFICANT CHANGE UP (ref 80–100)
MONOCYTES # BLD AUTO: 1.03 K/UL — HIGH (ref 0–0.9)
MONOCYTES NFR BLD AUTO: 13.8 % — SIGNIFICANT CHANGE UP (ref 2–14)
NEUTROPHILS # BLD AUTO: 5.2 K/UL — SIGNIFICANT CHANGE UP (ref 1.8–7.4)
NEUTROPHILS NFR BLD AUTO: 69.9 % — SIGNIFICANT CHANGE UP (ref 43–77)
NON HDL CHOLESTEROL: 105 MG/DL — SIGNIFICANT CHANGE UP
PLATELET # BLD AUTO: 265 K/UL — SIGNIFICANT CHANGE UP (ref 150–400)
POTASSIUM SERPL-MCNC: 4.6 MMOL/L — SIGNIFICANT CHANGE UP (ref 3.5–5.3)
POTASSIUM SERPL-SCNC: 4.6 MMOL/L — SIGNIFICANT CHANGE UP (ref 3.5–5.3)
RBC # BLD: 2.33 M/UL — LOW (ref 4.2–5.8)
RBC # BLD: 2.33 M/UL — LOW (ref 4.2–5.8)
RBC # FLD: 15.5 % — HIGH (ref 10.3–14.5)
RETICS #: 52.2 K/UL — SIGNIFICANT CHANGE UP (ref 25–125)
RETICS/RBC NFR: 2.2 % — SIGNIFICANT CHANGE UP (ref 0.5–2.5)
SARS-COV-2 IGG SERPL QL IA: POSITIVE
SARS-COV-2 IGM SERPL IA-ACNC: 26.1 INDEX — HIGH
SODIUM SERPL-SCNC: 132 MMOL/L — LOW (ref 135–145)
SOURCE HSV 1/2: SIGNIFICANT CHANGE UP
TIBC SERPL-MCNC: 177 UG/DL — LOW (ref 220–430)
TRANSFERRIN SERPL-MCNC: 124 MG/DL — LOW (ref 180–329)
TRIGL SERPL-MCNC: 96 MG/DL — SIGNIFICANT CHANGE UP
TROPONIN T SERPL-MCNC: 0.08 NG/ML — HIGH (ref 0–0.06)
VANCOMYCIN FLD-MCNC: <4 UG/ML — SIGNIFICANT CHANGE UP
WBC # BLD: 7.44 K/UL — SIGNIFICANT CHANGE UP (ref 3.8–10.5)
WBC # FLD AUTO: 7.44 K/UL — SIGNIFICANT CHANGE UP (ref 3.8–10.5)

## 2021-02-12 PROCEDURE — 99233 SBSQ HOSP IP/OBS HIGH 50: CPT

## 2021-02-12 PROCEDURE — 93880 EXTRACRANIAL BILAT STUDY: CPT | Mod: 26

## 2021-02-12 PROCEDURE — 71250 CT THORAX DX C-: CPT | Mod: 26

## 2021-02-12 PROCEDURE — 99232 SBSQ HOSP IP/OBS MODERATE 35: CPT

## 2021-02-12 PROCEDURE — 99223 1ST HOSP IP/OBS HIGH 75: CPT

## 2021-02-12 PROCEDURE — 99222 1ST HOSP IP/OBS MODERATE 55: CPT

## 2021-02-12 PROCEDURE — 70450 CT HEAD/BRAIN W/O DYE: CPT | Mod: 26

## 2021-02-12 RX ORDER — ACETAMINOPHEN 500 MG
650 TABLET ORAL EVERY 6 HOURS
Refills: 0 | Status: DISCONTINUED | OUTPATIENT
Start: 2021-02-12 | End: 2021-02-16

## 2021-02-12 RX ORDER — ONDANSETRON 8 MG/1
4 TABLET, FILM COATED ORAL EVERY 6 HOURS
Refills: 0 | Status: DISCONTINUED | OUTPATIENT
Start: 2021-02-12 | End: 2021-02-16

## 2021-02-12 RX ORDER — HYDRALAZINE HCL 50 MG
5 TABLET ORAL EVERY 8 HOURS
Refills: 0 | Status: DISCONTINUED | OUTPATIENT
Start: 2021-02-12 | End: 2021-02-12

## 2021-02-12 RX ORDER — HYDRALAZINE HCL 50 MG
10 TABLET ORAL EVERY 6 HOURS
Refills: 0 | Status: DISCONTINUED | OUTPATIENT
Start: 2021-02-12 | End: 2021-02-16

## 2021-02-12 RX ORDER — ERYTHROPOIETIN 10000 [IU]/ML
10000 INJECTION, SOLUTION INTRAVENOUS; SUBCUTANEOUS
Refills: 0 | Status: DISCONTINUED | OUTPATIENT
Start: 2021-02-12 | End: 2021-02-16

## 2021-02-12 RX ADMIN — HEPARIN SODIUM 5000 UNIT(S): 5000 INJECTION INTRAVENOUS; SUBCUTANEOUS at 10:44

## 2021-02-12 RX ADMIN — Medication 1 DROP(S): at 05:12

## 2021-02-12 RX ADMIN — Medication 1 DROP(S): at 11:07

## 2021-02-12 RX ADMIN — HEPARIN SODIUM 5000 UNIT(S): 5000 INJECTION INTRAVENOUS; SUBCUTANEOUS at 22:13

## 2021-02-12 RX ADMIN — ERYTHROPOIETIN 10000 UNIT(S): 10000 INJECTION, SOLUTION INTRAVENOUS; SUBCUTANEOUS at 17:32

## 2021-02-12 RX ADMIN — Medication 1 DROP(S): at 17:54

## 2021-02-12 NOTE — CONSULT NOTE ADULT - SUBJECTIVE AND OBJECTIVE BOX
[facility]  INFECTIOUS DISEASES AND INTERNAL MEDICINE at [place]  =======================================================  Jr Levin MD  Diplomates American Board of Internal Medicine and Infectious Diseases  Tel  [phone]  Fax [phone]  =======================================================    [id number]  BEST THRASHERMICHELLEETIENNE   HPI:  This  75y Male who is on HD as of November 2020 and also got renal stents b/L during that period by Dr. Crabtree ( as per pt's wife ) presented with hypotension and  AMS following hemodialysis this afternoon. Pt. completed his dialysis and was waiting to be picked up by his wife when his symptoms of confusion and hypotension developed. During transport to [facility], EMS noticed a left facial droop and code stroke was activated. Later ER physician spoke to pt's wife who stated that pt. had Bell's palsy in December 2020 and his facial droop is from that and not a new finding. I have spoke to pt's wife as well who is at the bedside at the time of admission and told me same thing about his facial droop as well. Pt. was evaluated by neurologist Dr. Jimenez in the ER who thinks it is likely not a stroke and no alteplase was recommended. As per pt's wife he was discharged on Feb, 9th , 2021 from [facility] after he was admitted there for 1 week for generalized weakness and decreased po intake. pt. was discharged with Dronabinol. Pt. has been covid -19 positive. no sick person at home. no fever at home, pt. afebrile in the ED as well. pt. was sedated to get his ct head, got iv contrast and later got LP by ER team and was sedated with ativan and haldol. Pt. sedated at the time of admission. Pt. has not been giving much history as per ER team. There has been no n/v/d. cp, sob per history.   (11 Feb 2021 19:17)    he was worked up for meningitis.   s/p lumbar puncture. CSF fluid with 2 nucleated cell. , CSF PCR for herpes 1/2 specifically, and also CSF PCR for infectious bacterial and viral etiologies (including HSV 1/2) are negative.   patient was given:  acyclovir IVPB   110 mL/Hr (02-11-21 @ 23:02)  cefTRIAXone   IVPB   100 mL/Hr (02-11-21 @ 19:41)    No sick contacts.  he reports that he had a [place] palsy on the left side for some time. wears and eye patch over the left eye.       I have personally reviewed the labs and data; pertinent labs and data are listed in this note; please see below.   =======================================================  Past Medical & Surgical Hx:  =====================  PAST MEDICAL & SURGICAL HISTORY:  Prostate CA  as per wife had radiation treatment in 2018.    H/O Bell&#x27;s palsy    Chronic kidney disease (CKD)  on HD as of Novemeber, 2020. had  b/L renal stents in nov, 2020 .    No significant past surgical history      Problem List:  ==========  HEALTH ISSUES - PROBLEM Dx:  H/O prostate cancer   Troponin level elevated    H/O Bell&#x27;s palsy   Anemia, unspecified type    Stage 5 chronic kidney disease on chronic dialysis   Hypotension, unspecified hypotension type    Altered mental status, unspecified altered mental status type         Social Hx:  =======  no toxic habits currently    FAMILY HISTORY:  FH: breast cancer  mother    no significant family history of immunosuppressive disorders in mother or father   =======================================================    REVIEW OF SYSTEMS:  CONSTITUTIONAL:  No Fever or chills  HEENT:  No diplopia or blurred vision.  No earache, sore throat or runny nose.  CARDIOVASCULAR:  No pressure, squeezing, strangling, tightness, heaviness or aching about the chest, neck, axilla or epigastrium.  RESPIRATORY:  No cough, shortness of breath  GASTROINTESTINAL:  No nausea, vomiting or diarrhea.  GENITOURINARY:  No dysuria, frequency or urgency. No Blood in urine  MUSCULOSKELETAL:  no joint aches, no muscle pain  SKIN:  No change in skin, hair or nails.  NEUROLOGIC:  hx of BELLS PELSY  PSYCHIATRIC:  No disorder of thought or mood.  ENDOCRINE:  No heat or cold intolerance  HEMATOLOGICAL:  No easy bruising or bleeding.    =======================================================  Allergies  No Known Allergies      Antibiotics:  vancomycin  IVPB 1000 milliGRAM(s) IV Intermittent once    Other medications:  artificial tears (preservative free) Ophthalmic Solution 1 Drop(s) Left EYE four times a day  heparin   Injectable 5000 Unit(s) SubCutaneous every 12 hours   acyclovir IVPB   110 mL/Hr IV Intermittent (02-11-21 @ 23:02)    cefTRIAXone   IVPB   100 mL/Hr IV Intermittent (02-11-21 @ 19:41)      ======================================================  Physical Exam:  ============  T(F): 98.2 (12 Feb 2021 07:21), Max: 98.2 (11 Feb 2021 16:08)  HR: 91 (12 Feb 2021 07:21)  BP: 134/86 (12 Feb 2021 07:21)  RR: 18 (12 Feb 2021 07:21)  SpO2: 97% (12 Feb 2021 07:21) (96% - 100%)  temp max in last 48H T(F): , Max: 98.2 (02-11-21 @ 16:08)  Weight (kg): 76 (02-11-21 @ 16:21)    General:  No acute distress.  Eye: Pupils are equal, round and reactive to light, Extraocular movements are intact, Normal conjunctiva.  HENT: Normocephalic, Oral mucosa is moist, No pharyngeal erythema, No sinus tenderness.  Neck: Supple, No lymphadenopathy.  RIGHT SC tunneled HD catheter in place.   Respiratory: Lungs are clear to auscultation, Respirations are non-labored.  Cardiovascular: Normal rate, Regular rhythm,   Gastrointestinal: Soft, Non-tender, Non-distended, Normal bowel sounds.  Genitourinary: No costovertebral angle tenderness.  + CONDOM catheter - no urine.  Lymphatics: No lymphadenopathy neck,   Musculoskeletal: Normal range of motion, Normal strength.  Integumentary: No rash.  Neurologic: Alert, Oriented,  Left facial palsy  Psychiatric: Appropriate mood & affect.    =======================================================  Labs:                        7.4    7.44  )-----------( 265      ( 12 Feb 2021 03:04 )             22.5     02-12    132<L>  |  93<L>  |  31.0<H>  ----------------------------<  90  4.6   |  29.0  |  5.42<H>    Ca    8.5<L>      12 Feb 2021 03:04    TPro  5.9<L>  /  Alb  2.7<L>  /  TBili  0.4  /  DBili  x   /  AST  17  /  ALT  10  /  AlkPhos  87  02-11      Culture - CSF with Gram Stain (collected 02-11-21 @ 21:54)  Source: .CSF CSF  Gram Stain (02-11-21 @ 22:24):    No polymorphonuclear leukocytes seen    No organisms seen    by cytocentrifuge          Creatinine, Serum: 5.42 mg/dL (02-12-21 @ 03:04)  Creatinine, Serum: 4.44 mg/dL (02-11-21 @ 16:24)          SARS-CoV-2: NotDetec (02-11-21 @ 21:31)  Rapid RVP Result: NotDetec (02-11-21 @ 21:31)           CSF PCR Panel (02.12.21 @ 00:09)   CSF PCR Result: NotDete: The meningitis/encephalitis (ME) panel (CSFPCR) is a PCR based assay that   screens for: Escherichia coli; Haemophilus influenzae; Listeria   monocytogenes; Neisseria meningitidis; Streptococcus agalactiae;   Streptococcus pneumoniae; CMV; Enterovirus; HSV-1; HSV-2; Human   herpesvirus 6; Parechovirus; VZV and Cryptococcus. Result should be   interpreted in context of clinical presentation, imaging and other lab   tests. Positive predictive value may be lower in patients with normal CSF   chemistry and cell count.       HSV 1/2 PCR: [facility]: HSV1/2 PCR assay is a real-time PCR test that detects and differentiates   HSV-1 and/or HSV-2 DNA in CSF (Vitreous Fluid). The results of this test   should be interpreted with consideration of all clinical and laboratory   findings. (02.12.21 @ 00:09)     Culture - CSF with Gram Stain . (02.11.21 @ 21:54)   Gram Stain:   No polymorphonuclear leukocytes seen   No organisms seen   by cytocentrifuge   Specimen Source: .CSF CSF     Cerebrospinal Fluid Cell Count-1 (02.11.21 @ 18:35)   Tube Type: Tube 2   RBC Count - Spinal Fluid: 2 /cmm   CSF Color: No Color   CSF Appearance: Clear   CSF Segmented Neutrophils: N/A %   Appearance Spun: Colorless   Total Nucleated Cell Count, CSF: 2 /uL      < from: CT Brain Stroke Protocol (02.11.21 @ 16:31) >     EXAM:  CT PERFUSION W MAPS IC                         EXAM:  CT ANGIO BRAIN (W)AW IC                         EXAM:  CT BRAIN STROKE PROTOCOL                         EXAM:  CT ANGIO NECK (W)AW IC                          PROCEDURE DATE:  02/11/2021         INTERPRETATION:  CLINICAL HISTORY: Stroke Code. . .    TECHNIQUE: Noncontrast CT head. RAPID artificial intelligence was used for preliminary evaluation of intracranial hemorrhage. After the administration of 50 cc Omnipaque 350, serial thinsections were obtained through the brain for the purposes of evaluating CT perfusion. Raw data was sent to the RAPID software for postprocessing. Contrast enhanced axial CT images were acquired from the aortic arch to the vertex of the calvarium, during the angiographic phase.  Three-dimensional maximum intensity projection reformats were generated.  70 ml of Omnipaque-350 mg/ml were administered intravenously, without immediate complication.    COMPARISON STUDY: None.    FINDINGS:    CT BRAIN WITHOUT CONTRAST:    Motion degraded exam.    Chronic lacunar infarct versus dilated perivascular space in the right subinsular region. There is no acute intracranial hemorrhage or mass effect. The ventricles and sulci are normal in size for patient's age.    There is no extraaxial fluid collection.    There is no displaced calvarial fracture. The visualized orbits are within normal limits. Bilateral maxillary sinus polyps or retention cysts. The mastoid air cells are well aerated.    CT PERFUSION:    CBF<30% volume: 0 ml  Tmax>6.0s volume: 0 ml  Mismatch volume: 0 ml  Mismatch ratio: None    CT ANGIOGRAPHY NECK:    Very limited, motion degraded exam. Portions of the bilateral vertebral arteries and ICAs are not well visualized due to artifact. No gross evidence of vascular occlusion. No evidence of hemodynamically significant stenosis of the bilateral cervical ICAs using NASCET criteria.    1.4 cm nodular opacity in the right lung.    CT ANGIOGRAPHY BRAIN:    Nondiagnostic due to motion.      IMPRESSION:    Brain CT without contrast:  Motion degraded exam. No evidence of intracranial hemorrhage or mass effect. If clinical suspicion for acute infarct persists, brain MRI can be obtained if there is no contraindication.    CT perfusion:  No evidence of core infarct.    CT angiography neck:  1.  Very limited, motion degraded exam. Portions of the bilateral vertebral arteries and ICAs are not well visualized due to artifact. No gross evidence of vascular occlusion. No evidence of hemodynamically significant stenosis of the bilateral cervical ICAs using NASCET criteria.  2.  1.4 cm nodular opacity in the right lung. Recommend CT chest.    CT angiography brain: Nondiagnostic due to motion.    Noncontrast CT head findings were discussedwith Dr. HOWARD RAM [phone] at 2/11/2021 4:43 PM by Dr. Karen Ram with read back confirmation.  CTA and CT perfusion findings were discussed with Dr. HOWARD RAM [phone] at 2/11/2021 4:55 PM by Dr. Karen Ram with read back confirmation.           KAREN RAM MD; Attending Radiologist  This document has been electronically signed. Feb 11 2021  4:57PM    < end of copied text >

## 2021-02-12 NOTE — OCCUPATIONAL THERAPY INITIAL EVALUATION ADULT - MANUAL MUSCLE TESTING RESULTS, REHAB EVAL
bilateral shoulder flexion grossly assessed with AROM against gravity 3/5, bilateral elbow flexion/extension grossly assessed with AROM against gravity 3/5, right gross grasp 4-/5, left gross grasp 5/5

## 2021-02-12 NOTE — OCCUPATIONAL THERAPY INITIAL EVALUATION ADULT - ADDITIONAL COMMENTS
Information should be verified as pt is questionable historian however pt reports he lives in apartment with stairs to enter (unsure of amount) and no steps inside. Pt unable if bathroom has bathtub or shower stall. Pt does not own any DME. Pt is right handed.

## 2021-02-12 NOTE — OCCUPATIONAL THERAPY INITIAL EVALUATION ADULT - PERTINENT HX OF CURRENT PROBLEM, REHAB EVAL
Pt presents to ED due to c/o hypotension and altered mental status after completing dialysis. En route to hospital, pt also developed left facial droop per EMS. CT and CTA studies were negative for acute event however MRI is pending.

## 2021-02-12 NOTE — PROGRESS NOTE ADULT - ASSESSMENT
INCOMPLETTE 75 years old male,    1) AMS  - Hypotensive after HD  - No signs of active infection   - MRI pending  - Neuro follow up noted   - ID Consult appreciated     2) Hypotension  - Likely due to fluid removal after HD  - BP has been stable here    3) ESRD  - On dialysis  - Nephrology called, HD today    4) Elevated Troponin  - Flat  - Likely due to decreased renal clearance    5) Anemia  - Likely due to chronic disease  - Monitor     6) Right Lung Opacity  - History of prostate cancer  - CT Chest pending     DVT Prophylaxis -- Heparin SQ    Dispo: Likely Rehab on Monday.

## 2021-02-12 NOTE — SWALLOW BEDSIDE ASSESSMENT ADULT - SWALLOW EVAL: DIAGNOSIS
At least mild oral dysphagia impacted by left side facial weakness/asymmetery 2/2 recent [place] Palsy; lethargy also impacting. Pharyngeal stage appears WFL for puree and thin liquids.

## 2021-02-12 NOTE — PHYSICAL THERAPY INITIAL EVALUATION ADULT - ADDITIONAL COMMENTS
Pt is a questionable historian, states he lives alone in an apartment and was independent PTA with no DME and unknown # of stairs at home. Per documentation, pt has a spouse.

## 2021-02-12 NOTE — SWALLOW BEDSIDE ASSESSMENT ADULT - SLP GENERAL OBSERVATIONS
Pt lethargic, eyes closed, left facial asymmetry, left eye patch (recent Cut Off Palsy), Ox1, confused, O2 NC, Sat 95%

## 2021-02-12 NOTE — SWALLOW BEDSIDE ASSESSMENT ADULT - SLP PERTINENT HISTORY OF CURRENT PROBLEM
pt. is a 75y Male who is on HD as of November 2020 and also got renal stents b/L during that period by Dr. Crabtree presented with hypotension and  AMS following hemodialysis this afternoon. Pt. completed dialysis and was waiting to be picked up by his wife when his symptoms of confusion and hypotension developed. During transport to [facility], EMS noticed a left facial droop and code stroke was activated. Later ER physician spoke to pt's wife who stated that pt. had Bell's palsy in December 2020 and his facial droop is from that and not a new finding.  Pt. was evaluated by neurologist Dr. Bonilla in the ER who thinks it is likely not a stroke and no alteplase was recommended. As per pt's wife he was discharged on Feb, 9th , 2021 from [facility] after he was admitted there for 1 week for generalized weakness and decreased po intake. pt. was discharged with Dronabinol. Pt. has been covid -19 positive. no sick person at home.

## 2021-02-12 NOTE — OCCUPATIONAL THERAPY INITIAL EVALUATION ADULT - SPECIAL TRAINING, OT EVAL
Functional mobility for couple steps at bedside with minimum assistance and RW due to decreased strength, decreased postural control and decreased balance/coordination; cues for foot placement, body positioning with relation to RW and RW management with environment/obstacle negotiation. Pt requires increased time and verbal/tactile cues throughout mobility for safety.

## 2021-02-12 NOTE — PHYSICAL THERAPY INITIAL EVALUATION ADULT - GAIT DEVIATIONS NOTED, PT EVAL
decreased nick/increased time in double stance/decreased step length/decreased weight-shifting ability

## 2021-02-12 NOTE — OCCUPATIONAL THERAPY INITIAL EVALUATION ADULT - PRECAUTIONS/LIMITATIONS, REHAB EVAL
head of bed 30 degrees/aspiration precautions/fall precautions/oxygen therapy device and L/min/seizure precautions

## 2021-02-12 NOTE — OCCUPATIONAL THERAPY INITIAL EVALUATION ADULT - NS ASR FOLLOW COMMAND OT EVAL
pt requires increased time, cues and repetition to process commands of tasks; pt with decreased attention requiring cues to redirect to tasks; pt requires cues to sequence and problem solve tasks/mobility/75% of the time/able to follow single-step instructions

## 2021-02-12 NOTE — OCCUPATIONAL THERAPY INITIAL EVALUATION ADULT - REHAB POTENTIAL, OT EVAL
Medication(s) Requested: vyvanse 40MG  Last office visit: 11/6/19  Nv: none  Last refill: 12/16/19  Disp- 12/16/19  Is the patient due for refill of this medication(s): Yes  PDMP review: Criteria met. Forwarded to Physician/DAKSHA for signature.       Prior auth. Needed and initiated      good, to achieve stated therapy goals

## 2021-02-12 NOTE — PROGRESS NOTE ADULT - PROBLEM SELECTOR PLAN 1
MRI brain w/o contrast and carotid dopplers pending  R/o underlying infection  Correct metabolic abnormalities

## 2021-02-12 NOTE — CONSULT NOTE ADULT - SUBJECTIVE AND OBJECTIVE BOX
Patient is a 75y old  Male who presents with a chief complaint of AMS (12 Feb 2021 11:47)       HPI:  pt. is a 75y Male who is on HD as of November 2020 and also got renal stents b/L during that period by Dr. Crabtree ( as per pt's wife ) presented with hypotension and  AMS following hemodialysis this afternoon. Pt. completed his dialysis and was waiting to be picked up by his wife when his symptoms of confusion and hypotension developed. During transport to [facility], EMS noticed a left facial droop and code stroke was activated. Later ER physician spoke to pt's wife who stated that pt. had Bell's palsy in December 2020 and his facial droop is from that and not a new finding. I have spoke to pt's wife as well who is at the bedside at the time of admission and told me same thing about his facial droop as well. Pt. was evaluated by neurologist Dr. Bonilla in the ER who thinks it is likely not a stroke and no alteplase was recommended. As per pt's wife he was discharged on Feb, 9th , 2021 from [facility] after he was admitted there for 1 week for generalized weakness and decreased po intake. pt. was discharged with Dronabinol. Pt. has been covid -19 positive. no sick person at home. no fever at home, pt. afebrile in the ED as well. pt. was sedated to get his ct head, got iv contrast and later got LP by ER team and was sedated with ativan and haldol. Pt. sedated at the time of admission. Pt. has not been giving much history as per ER team. There has been no n/v/d. cp, sob per history.   (11 Feb 2021 19:17) Renal is being consulted for ESRD mangement. He was just recently seen by us in [facility]        PAST MEDICAL & SURGICAL HISTORY:  Prostate CA  as per wife had radiation treatment in 2018.    H/O Bell&#x27;s palsy    Chronic kidney disease (CKD)  on HD as of Novemeber, 2020. had  b/L renal stents in nov, 2020 .    No significant past surgical history         FAMILY HISTORY:  FH: breast cancer  mother    [place]    Social History:Non smoker    MEDICATIONS  (STANDING):  artificial tears (preservative free) Ophthalmic Solution 1 Drop(s) Left EYE four times a day  heparin   Injectable 5000 Unit(s) SubCutaneous every 12 hours  Nephro-chris 1 Tablet(s) Oral daily    MEDICATIONS  (PRN):  acetaminophen   Tablet .. 650 milliGRAM(s) Oral every 6 hours PRN Temp greater or equal to 38C (100.4F), Mild Pain (1 - 3), Moderate Pain (4 - 6)  hydrALAZINE Injectable 5 milliGRAM(s) IV Push every 8 hours PRN for sbp above 160 and dbp above 100.  ondansetron Injectable 4 milliGRAM(s) IV Push every 6 hours PRN Nausea and/or Vomiting   Meds reviewed    Allergies    No Known Allergies    Intolerances         REVIEW OF SYSTEMS:    Review of Systems:  As per HPI, otherwise no other history available       Vital Signs Last 24 Hrs  T(C): 36.6 (12 Feb 2021 12:00), Max: 36.8 (11 Feb 2021 16:08)  T(F): 97.8 (12 Feb 2021 12:00), Max: 98.2 (11 Feb 2021 16:08)  HR: 72 (12 Feb 2021 12:00) (70 - 96)  BP: 128/109 (12 Feb 2021 12:00) (107/63 - 154/78)  BP(mean): 119 (12 Feb 2021 12:00) (108 - 119)  RR: 17 (12 Feb 2021 12:00) (16 - 20)  SpO2: 100% (12 Feb 2021 12:00) (96% - 100%)  Daily     Daily     PHYSICAL EXAM:    GENERAL: NAD  HEAD:  Atraumatic, Normocephalic  EYES: EOMI, conjunctiva and sclera clear  ENMT: No Drainage from nares, No drainage from ears  NECK: Supple, neck  veins full  NERVOUS SYSTEM:  Awake and Alert  CHEST/LUNG: Clear to percussion bilaterally; No rales, rhonchi, wheezing, or rubs  HEART: Regular rate and rhythm; No murmurs, rubs, or gallops  ABDOMEN: Soft, Nontender, Nondistended; Bowel sounds present  EXTREMITIES:  No Edema  SKIN: No rashes No obvious ecchymosis      LABS:                        7.4    7.44  )-----------( 265      ( 12 Feb 2021 03:04 )             22.5     02-12    132<L>  |  93<L>  |  31.0<H>  ----------------------------<  90  4.6   |  29.0  |  5.42<H>    Ca    8.5<L>      12 Feb 2021 03:04    TPro  5.9<L>  /  Alb  2.7<L>  /  TBili  0.4  /  DBili  x   /  AST  17  /  ALT  10  /  AlkPhos  87  02-11    PT/INR - ( 11 Feb 2021 16:24 )   PT: 12.2 sec;   INR: 1.05 ratio         PTT - ( 11 Feb 2021 16:24 )  PTT:31.2 sec            RADIOLOGY & ADDITIONAL TESTS:

## 2021-02-12 NOTE — OCCUPATIONAL THERAPY INITIAL EVALUATION ADULT - PLANNED THERAPY INTERVENTIONS, OT EVAL
No, the patient is not being discharged from [facility]
toilet/ADL retraining/balance training/bed mobility training/cognitive, visual perceptual/fine motor coordination training/motor coordination training/neuromuscular re-education/parent/caregiver training.../ROM/strengthening/transfer training

## 2021-02-12 NOTE — PHYSICAL THERAPY INITIAL EVALUATION ADULT - PERTINENT HX OF CURRENT PROBLEM, REHAB EVAL
75y Male who presented with hypotension and  AMS. Pt with recent h/o bells palsy and Covid 19. CT head (-) for acute findings. Pt is pending MRI of head and carotid dopplers. Admitted for suspected TIA

## 2021-02-12 NOTE — OCCUPATIONAL THERAPY INITIAL EVALUATION ADULT - GENERAL OBSERVATIONS, REHAB EVAL
Received pt in semi-shoemaker position in bed, +IV lock, +telemetry, +gipson, +chest dialysis port, +3LNCO2, +left eye patch. Pt agrees to OT.

## 2021-02-12 NOTE — PROGRESS NOTE ADULT - ASSESSMENT
74 yo man with hx of dementia with AMS during dialysis likely due to hypotension vs toxic metabolic encephalopathy. L facial droop likely 2/2 prior history of bell's palsy

## 2021-02-12 NOTE — SWALLOW BEDSIDE ASSESSMENT ADULT - SWALLOW EVAL: RECOMMENDED FEEDING/EATING TECHNIQUES
check mouth frequently for oral residue/pocketing/crush medication (when feasible)/maintain upright posture during/after eating for 30 mins/oral hygiene/position upright (90 degrees)/small sips/bites

## 2021-02-12 NOTE — OCCUPATIONAL THERAPY INITIAL EVALUATION ADULT - MODIFIED CLINICAL TEST OF SENSORY INTEGRATION IN BALANCE TEST
dynamic sitting edge of bed with close supervision; dynamic standing with RW with minimum assistance

## 2021-02-13 ENCOUNTER — TRANSCRIPTION ENCOUNTER (OUTPATIENT)
Age: 76
End: 2021-02-13

## 2021-02-13 LAB
ANION GAP SERPL CALC-SCNC: 10 MMOL/L — SIGNIFICANT CHANGE UP (ref 5–17)
BUN SERPL-MCNC: 24 MG/DL — HIGH (ref 8–20)
CALCIUM SERPL-MCNC: 9 MG/DL — SIGNIFICANT CHANGE UP (ref 8.6–10.2)
CHLORIDE SERPL-SCNC: 95 MMOL/L — LOW (ref 98–107)
CO2 SERPL-SCNC: 28 MMOL/L — SIGNIFICANT CHANGE UP (ref 22–29)
CREAT SERPL-MCNC: 5.59 MG/DL — HIGH (ref 0.5–1.3)
FERRITIN SERPL-MCNC: 1670 NG/ML — HIGH (ref 30–400)
GLUCOSE SERPL-MCNC: 93 MG/DL — SIGNIFICANT CHANGE UP (ref 70–99)
HCT VFR BLD CALC: 24.9 % — LOW (ref 39–50)
HGB BLD-MCNC: 8.1 G/DL — LOW (ref 13–17)
IRON SATN MFR SERPL: 21 % — SIGNIFICANT CHANGE UP (ref 16–55)
IRON SATN MFR SERPL: 39 UG/DL — LOW (ref 59–158)
MCHC RBC-ENTMCNC: 32 PG — SIGNIFICANT CHANGE UP (ref 27–34)
MCHC RBC-ENTMCNC: 32.5 GM/DL — SIGNIFICANT CHANGE UP (ref 32–36)
MCV RBC AUTO: 98.4 FL — SIGNIFICANT CHANGE UP (ref 80–100)
PHOSPHATE SERPL-MCNC: 3.8 MG/DL — SIGNIFICANT CHANGE UP (ref 2.4–4.7)
PLATELET # BLD AUTO: 319 K/UL — SIGNIFICANT CHANGE UP (ref 150–400)
POTASSIUM SERPL-MCNC: 4.8 MMOL/L — SIGNIFICANT CHANGE UP (ref 3.5–5.3)
POTASSIUM SERPL-SCNC: 4.8 MMOL/L — SIGNIFICANT CHANGE UP (ref 3.5–5.3)
RBC # BLD: 2.53 M/UL — LOW (ref 4.2–5.8)
RBC # FLD: 15.8 % — HIGH (ref 10.3–14.5)
SODIUM SERPL-SCNC: 133 MMOL/L — LOW (ref 135–145)
TIBC SERPL-MCNC: 184 UG/DL — LOW (ref 220–430)
WBC # BLD: 6.91 K/UL — SIGNIFICANT CHANGE UP (ref 3.8–10.5)
WBC # FLD AUTO: 6.91 K/UL — SIGNIFICANT CHANGE UP (ref 3.8–10.5)

## 2021-02-13 PROCEDURE — 70551 MRI BRAIN STEM W/O DYE: CPT | Mod: 26

## 2021-02-13 PROCEDURE — 99233 SBSQ HOSP IP/OBS HIGH 50: CPT

## 2021-02-13 RX ORDER — AMLODIPINE BESYLATE 2.5 MG/1
5 TABLET ORAL DAILY
Refills: 0 | Status: DISCONTINUED | OUTPATIENT
Start: 2021-02-13 | End: 2021-02-13

## 2021-02-13 RX ADMIN — Medication 1 TABLET(S): at 12:11

## 2021-02-13 RX ADMIN — HEPARIN SODIUM 5000 UNIT(S): 5000 INJECTION INTRAVENOUS; SUBCUTANEOUS at 12:11

## 2021-02-13 RX ADMIN — Medication 1 DROP(S): at 22:20

## 2021-02-13 RX ADMIN — Medication 1 DROP(S): at 06:00

## 2021-02-13 RX ADMIN — HEPARIN SODIUM 5000 UNIT(S): 5000 INJECTION INTRAVENOUS; SUBCUTANEOUS at 20:59

## 2021-02-13 RX ADMIN — Medication 1 DROP(S): at 00:12

## 2021-02-13 RX ADMIN — Medication 1 DROP(S): at 17:51

## 2021-02-13 NOTE — PROGRESS NOTE ADULT - ASSESSMENT
75 years old male with PMH of ESRD on Dialysis, Prostate Cancer and Anemia presented with confusion.     1) AMS  - Hypotensive after HD  - No signs of active infection   - MRI pending  - Neuro follow up noted   - ID Consult appreciated     2) Hypotension  - Likely due to fluid removal after HD  - BP has been stable here in fact is in hypertensive range, will add Amlodipine if BP remains > 140/90    3) ESRD on dialysis  - HD today   - Nephrology following     4) Elevated Troponin  - Flat  - Likely due to decreased renal clearance    5) Anemia  - Likely due to chronic disease  - Retacrit with HD  - Monitor     6) Right Lung Nodule  - No signs of infection  - Repeat CT Chest in 3 months  - Outpatient follow up with Pulmonary     7) Ascending Aortic Aneurysm  - Will get CTA Chest prior to next HD  - Monitor and control BP  - CTS Consult     8) History of prostate cancer  - Outpatient follow up    DVT Prophylaxis -- Heparin SQ    Dispo: Likely Rehab in 48 hours

## 2021-02-13 NOTE — DISCHARGE NOTE NURSING/CASE MANAGEMENT/SOCIAL WORK - PATIENT PORTAL LINK FT
You can access the FollowMyHealth Patient Portal offered by [facility] by registering at the following website: [email or web address]. By joining [facility]’s FollowMyHealth portal, you will also be able to view your health information using other applications (apps) compatible with our system.

## 2021-02-13 NOTE — PROGRESS NOTE ADULT - ASSESSMENT
ESRD  AMS  FTT  HTN  Anemia  Hypotension       -HD TTS. Proceed with dialysis today 2/13/21 as ordered  -AMS workup per neuro  -Procrit  -Will add Midodrine for BP support if needed    Thank you

## 2021-02-14 LAB
CULTURE RESULTS: NO GROWTH — SIGNIFICANT CHANGE UP
SPECIMEN SOURCE: SIGNIFICANT CHANGE UP

## 2021-02-14 PROCEDURE — 99232 SBSQ HOSP IP/OBS MODERATE 35: CPT

## 2021-02-14 RX ORDER — AMLODIPINE BESYLATE 2.5 MG/1
5 TABLET ORAL DAILY
Refills: 0 | Status: DISCONTINUED | OUTPATIENT
Start: 2021-02-14 | End: 2021-02-16

## 2021-02-14 RX ADMIN — Medication 1 DROP(S): at 17:03

## 2021-02-14 RX ADMIN — Medication 1 TABLET(S): at 13:47

## 2021-02-14 RX ADMIN — Medication 1 DROP(S): at 05:17

## 2021-02-14 RX ADMIN — HEPARIN SODIUM 5000 UNIT(S): 5000 INJECTION INTRAVENOUS; SUBCUTANEOUS at 09:03

## 2021-02-14 RX ADMIN — Medication 1 DROP(S): at 21:44

## 2021-02-14 RX ADMIN — HEPARIN SODIUM 5000 UNIT(S): 5000 INJECTION INTRAVENOUS; SUBCUTANEOUS at 21:44

## 2021-02-14 RX ADMIN — Medication 1 DROP(S): at 13:46

## 2021-02-14 RX ADMIN — AMLODIPINE BESYLATE 5 MILLIGRAM(S): 2.5 TABLET ORAL at 09:03

## 2021-02-14 NOTE — PROGRESS NOTE ADULT - ASSESSMENT
ESRD  AMS  FTT  HTN  Anemia  Hypotension       -HD TTS. Next dialysis to be done on 2/16/21 per schedule; orders placed  -AMS workup per neuro; MRI neg  -EPO as ordered; Hb improving  -Will add Midodrine for BP support if needed    Thank you

## 2021-02-14 NOTE — PROGRESS NOTE ADULT - ASSESSMENT
75 years old male with PMH of ESRD on Dialysis, Prostate Cancer and Anemia presented with confusion.     1) AMS  - Hypotensive after HD  - No signs of active infection   - MRI Brain with no acute findings   - Neuro follow up noted   - ID Consult appreciated     2) Hypotension  - Likely due to fluid removal after HD  - BP has been stable here in fact is in hypertensive range. Added Amlodipine 5 mg in am.     3) ESRD on dialysis  - Got HD yesterday   - Nephrology following     4) Elevated Troponin  - Flat  - Likely due to decreased renal clearance    5) Anemia  - Likely due to chronic disease  - Retacrit with HD  - Monitor     6) Right Lung Nodule  - No signs of infection  - Repeat CT Chest in 3 months (discussed with patient's wife)  - Outpatient follow up with Pulmonary     7) Ascending Aortic Aneurysm  - Will get CTA Chest prior to next HD  - Monitor and control BP  - CTS Consult pending     8) History of prostate cancer  - Outpatient follow up    DVT Prophylaxis -- Heparin SQ    Dispo: [facility] once arranged.

## 2021-02-14 NOTE — PROGRESS NOTE ADULT - PROBLEM SELECTOR PLAN 1
Improving  R/o underlying infection  Correct metabolic abnormalities  No further inpatient neurologic workup necessary at this time

## 2021-02-15 ENCOUNTER — TRANSCRIPTION ENCOUNTER (OUTPATIENT)
Age: 76
End: 2021-02-15

## 2021-02-15 LAB
ANION GAP SERPL CALC-SCNC: 12 MMOL/L — SIGNIFICANT CHANGE UP (ref 5–17)
BUN SERPL-MCNC: 24 MG/DL — HIGH (ref 8–20)
CALCIUM SERPL-MCNC: 9.1 MG/DL — SIGNIFICANT CHANGE UP (ref 8.6–10.2)
CHLORIDE SERPL-SCNC: 96 MMOL/L — LOW (ref 98–107)
CO2 SERPL-SCNC: 28 MMOL/L — SIGNIFICANT CHANGE UP (ref 22–29)
CREAT SERPL-MCNC: 6.56 MG/DL — HIGH (ref 0.5–1.3)
GLUCOSE SERPL-MCNC: 76 MG/DL — SIGNIFICANT CHANGE UP (ref 70–99)
HCT VFR BLD CALC: 24 % — LOW (ref 39–50)
HGB BLD-MCNC: 7.7 G/DL — LOW (ref 13–17)
MCHC RBC-ENTMCNC: 32.1 GM/DL — SIGNIFICANT CHANGE UP (ref 32–36)
MCHC RBC-ENTMCNC: 32.1 PG — SIGNIFICANT CHANGE UP (ref 27–34)
MCV RBC AUTO: 100 FL — SIGNIFICANT CHANGE UP (ref 80–100)
PHOSPHATE SERPL-MCNC: 2.3 MG/DL — LOW (ref 2.4–4.7)
PLATELET # BLD AUTO: 418 K/UL — HIGH (ref 150–400)
POTASSIUM SERPL-MCNC: 4.4 MMOL/L — SIGNIFICANT CHANGE UP (ref 3.5–5.3)
POTASSIUM SERPL-SCNC: 4.4 MMOL/L — SIGNIFICANT CHANGE UP (ref 3.5–5.3)
RBC # BLD: 2.4 M/UL — LOW (ref 4.2–5.8)
RBC # FLD: 16.4 % — HIGH (ref 10.3–14.5)
SARS-COV-2 RNA SPEC QL NAA+PROBE: DETECTED
SARS-COV-2 RNA SPEC QL NAA+PROBE: SIGNIFICANT CHANGE UP
SODIUM SERPL-SCNC: 136 MMOL/L — SIGNIFICANT CHANGE UP (ref 135–145)
WBC # BLD: 7.18 K/UL — SIGNIFICANT CHANGE UP (ref 3.8–10.5)
WBC # FLD AUTO: 7.18 K/UL — SIGNIFICANT CHANGE UP (ref 3.8–10.5)

## 2021-02-15 PROCEDURE — 99232 SBSQ HOSP IP/OBS MODERATE 35: CPT

## 2021-02-15 RX ADMIN — Medication 1 DROP(S): at 19:18

## 2021-02-15 RX ADMIN — Medication 1 DROP(S): at 23:29

## 2021-02-15 RX ADMIN — Medication 1 TABLET(S): at 10:10

## 2021-02-15 RX ADMIN — Medication 1 DROP(S): at 10:09

## 2021-02-15 RX ADMIN — HEPARIN SODIUM 5000 UNIT(S): 5000 INJECTION INTRAVENOUS; SUBCUTANEOUS at 21:10

## 2021-02-15 RX ADMIN — AMLODIPINE BESYLATE 5 MILLIGRAM(S): 2.5 TABLET ORAL at 05:13

## 2021-02-15 RX ADMIN — HEPARIN SODIUM 5000 UNIT(S): 5000 INJECTION INTRAVENOUS; SUBCUTANEOUS at 10:10

## 2021-02-15 RX ADMIN — Medication 1 DROP(S): at 05:13

## 2021-02-15 NOTE — DISCHARGE NOTE PROVIDER - NSDCMRMEDTOKEN_GEN_ALL_CORE_FT
dronabinol:   sevelamer hydrochloride 800 mg oral tablet: 1 tab(s) orally 3 times a day   amLODIPine 5 mg oral tablet: 1 tab(s) orally once a day  dronabinol:   epoetin imch 10,000 units/mL injectable solution: [id number] unit(s) injectable Tuesday, Thursday, Saturday with dialysis.   Nephro-Brittany oral tablet: 1 tab(s) orally once a day   sevelamer hydrochloride 800 mg oral tablet: 1 tab(s) orally 3 times a day

## 2021-02-15 NOTE — DISCHARGE NOTE PROVIDER - NSDCFUADDAPPT_GEN_ALL_CORE_FT
please call and schedule an appointment with Dr. Hughes office  Please make schedule an appointment with pulmonology for lung nodule surveillance Follow up with PMD in 1 week.  Follow up with Renal on Thursday for dialysis.  Please call and schedule an appointment with Dr. Hughes office  Please call and schedule an appointment with Pulmonology for lung nodule surveillance.  Follow up with GI for endoscopy.     Follow up with PMD in 1 week.  Follow up with Renal on Thursday for dialysis.  Please call and schedule an appointment with Pulmonology for lung nodule surveillance.  Follow up with GI for endoscopy.    Follow up with CTS in 6 months for aortic aneurysm.

## 2021-02-15 NOTE — DISCHARGE NOTE PROVIDER - NSDCCPCAREPLAN_GEN_ALL_CORE_FT
PRINCIPAL DISCHARGE DIAGNOSIS  Diagnosis: AMS (altered mental status)  Assessment and Plan of Treatment: improving      SECONDARY DISCHARGE DIAGNOSES  Diagnosis: Aneurysm of ascending aorta  Assessment and Plan of Treatment: incidental finding on CT Scan  please follow up with Dr. Hughes outpaitent for further management    Diagnosis: Incidental lung nodule  Assessment and Plan of Treatment: right lung nodule was noted on CT Scan  please follow up with pulmonology  and schedule repeat CT Scan in 3 months for surveillance    Diagnosis: Hypotension  Assessment and Plan of Treatment: resolved     PRINCIPAL DISCHARGE DIAGNOSIS  Diagnosis: AMS (altered mental status)  Assessment and Plan of Treatment: Likely due to hypotension after dialysis.   Improved.      SECONDARY DISCHARGE DIAGNOSES  Diagnosis: Aneurysm of ascending aorta  Assessment and Plan of Treatment: incidental finding on CT Scan  please follow up with Dr. Hughes outpaitent for further management    Diagnosis: Incidental lung nodule  Assessment and Plan of Treatment: right lung nodule was noted on CT Scan  please follow up with pulmonology  and schedule repeat CT Scan in 3 months for surveillance    Diagnosis: Hypotension  Assessment and Plan of Treatment: resolved

## 2021-02-15 NOTE — PROGRESS NOTE ADULT - ASSESSMENT
75 years old male with PMH of ESRD on Dialysis, Prostate Cancer and Anemia presented with confusion.     1) AMS  - Hypotensive after HD  - No signs of active infection   - MRI Brain with no acute findings   - Neuro follow up noted   - ID Consult appreciated     2) Hypotension  - Likely due to fluid removal after HD  - BP has been stable here in fact is in hypertensive range. Added Amlodipine 5 mg daily.    3) ESRD on dialysis  - HD in am  - Nephrology following     4) Elevated Troponin  - Flat  - Likely due to decreased renal clearance    5) Anemia  - Likely due to chronic disease  - Retacrit with HD  - Monitor     6) Right Lung Nodule  - No signs of infection  - Repeat CT Chest in 3 months (discussed with patient's wife)  - Outpatient follow up with Pulmonary     7) Ascending Aortic Aneurysm  - Will get CTA Chest prior to next HD  - Monitor and control BP  - CTS Consult pending     8) History of prostate cancer  - Outpatient follow up    9) COVID-19  - Recently treated   - Swab for discharge screening came back positive. No signs of active infection. Repeat swab ordered.     DVT Prophylaxis -- Heparin SQ    Dispo: [facility] once arranged. Needs negative COVID swab.

## 2021-02-15 NOTE — DISCHARGE NOTE PROVIDER - CARE PROVIDERS DIRECT ADDRESSES
[email or web address] ,[email or web address],DirectAddress_Unknown,DirectAddress_Unknown

## 2021-02-15 NOTE — DISCHARGE NOTE PROVIDER - PROVIDER TOKENS
PROVIDER:[TOKEN:[[id number]]],PROVIDER:[TOKEN:[[id number]]] PROVIDER:[TOKEN:[[id number]]],PROVIDER:[TOKEN:[[id number]]],PROVIDER:[TOKEN:[[id number]]]

## 2021-02-15 NOTE — DISCHARGE NOTE PROVIDER - HOSPITAL COURSE
75 years old male with PMH of ESRD on Dialysis, Bell's palsy, Prostate Cancer and Anemia presented to [facility] ED with confusion. In [facility] ED, pt noted to have left facial droop and code stroke was activated.  Consulted neurology, deem not a candidate for TPA and stroke not likely due to hx of [place] palsy with l facial droop baseline. CT was negative for hemorraghic pathology. Pt admitted to stroke unit for further work up. Nephrology followed pt for CKD requiring HD. Infectious disease consulted to rule out meningitis. Pt's lumbar puncture showed CSF fluid with 2 nucleated cell, CSF PCR for herpes 1/2 specifically, and also CSF PCR for infectious bacterial and viral etiologies (including HSV 1/2) are negative. Pt was given one dose of acyclovir IVPB. Neurology followed pt for further assessment including MRI and carotid dopplers. MR brain on 2/13 non diagnostic due to motion artifact, No evidence of acute infarct. Repeat CTA (2/13) with no evidence of intracranial hemorrhage, mass effect, or acute territorial infarct. Carotid doppler (2/12) showed moderate plaque without a hemodynamic abnormality. Pt evaluated by physical therapy, recommending [facility]. At this time pt is medically stable t be discharges to [facility] once placement is available.     Right lung opacity seen on chest xray. Followed up with CT chest with incidentals finding of 1.4x1.1 cm. Semi solid nodule in RLL. Discussed with pt and wife to repeat chest CT in 3 months and follow up with out pt pulmonology.     Also incidental ascending aorta aneurysm measuring 4.1-4.3 cm noted on Ct chest. Will refer pt to follow up with cardiothoracic surgery for out pt follow up and surveillance.     1) AMS  - Hypotensive after HD  - No signs of active infection   - MRI Brain with no acute findings   - Neuro follow up noted   - ID Consult appreciated     2) Hypotension  - Likely due to fluid removal after HD  - BP has been stable here in fact is in hypertensive range. Added Amlodipine 5 mg in am.     3) ESRD on dialysis  - Got HD yesterday   - Nephrology following     4) Elevated Troponin  - Flat  - Likely due to decreased renal clearance    5) Anemia  - Likely due to chronic disease  - Retacrit with HD  - Monitor     6) Right Lung Nodule  - No signs of infection  - Repeat CT Chest in 3 months (discussed with patient's wife)  - Outpatient follow up with Pulmonary     7) Ascending Aortic Aneurysm  - Will get CTA Chest prior to next HD  - Monitor and control BP  - CTS Consult pending     8) History of prostate cancer  - Outpatient follow up    DVT Prophylaxis -- Heparin SQ    Dispo: [facility] once arranged. 75 years old male with PMH of ESRD on Dialysis, Bell's palsy, Prostate Cancer and Anemia presented to [facility] ED with confusion. In [facility] ED, pt noted to have left facial droop and code stroke was activated.  Consulted neurology, deem not a candidate for TPA and stroke not likely due to hx of [place] palsy with l facial droop baseline. CT was negative for hemorraghic pathology. Pt admitted to stroke unit for further work up. Nephrology followed pt for CKD requiring HD. Infectious disease consulted to rule out meningitis. Pt's lumbar puncture showed CSF fluid with 2 nucleated cell, CSF PCR for herpes 1/2 specifically, and also CSF PCR for infectious bacterial and viral etiologies (including HSV 1/2) are negative. Pt was given one dose of acyclovir IVPB. Neurology followed pt for further assessment including MRI and carotid dopplers. MR brain on 2/13 non diagnostic due to motion artifact, No evidence of acute infarct. Repeat CTA (2/13) with no evidence of intracranial hemorrhage, mass effect, or acute territorial infarct. Carotid doppler (2/12) showed moderate plaque without a hemodynamic abnormality. Pt evaluated by physical therapy, recommending [facility]. At this time pt is medically stable t be discharges to [facility] once placement is available.     Right lung opacity seen on chest xray. Followed up with CT chest with incidentals finding of 1.4x1.1 cm. Semi solid nodule in RLL. Discussed with pt and wife to repeat chest CT in 3 months and follow up with out pt pulmonology.     Also incidental ascending aorta aneurysm measuring 4.1-4.3 cm noted on Ct chest. Will refer pt to follow up with cardiothoracic surgery for out pt follow up and surveillance.    75 years old male with PMH of ESRD on Dialysis, Bell's palsy, Prostate Cancer and Anemia presented to [facility] ED with confusion. In [facility] ED, pt noted to have left facial droop and code stroke was activated.  Consulted neurology, deem not a candidate for TPA and stroke not likely due to hx of [place] palsy with l facial droop baseline. CT was negative for hemorraghic pathology. Pt admitted for further work up. Nephrology followed pt for CKD requiring HD. Infectious disease consulted and meningitis ruled out. No other source of infection. Neurology followed pt for further assessment including MRI and carotid dopplers. MR brain on 2/13 non diagnostic due to motion artifact, No evidence of acute infarct. Carotid doppler (2/12) showed moderate plaque without a hemodynamic abnormality. Pt evaluated by physical therapy, recommending [facility]. At this time pt is medically stable t be discharges to [facility].     Right lung opacity seen on chest xray. Followed up with CT chest with incidentals finding of 1.4x1.1 cm. Semi solid nodule in RLL. Discussed with pt and wife to repeat chest CT in 3 months and follow up with out pt pulmonology.     Also incidental ascending aorta aneurysm measuring 4.2 cm. Patient will follow up with CTS.    On CTA chest, noted to have luminal narrowing at the antrum/pylorus of stomach. Recommended Endoscopy as an outpatient.     AMS likely due to hypotension.    PHYSICAL EXAM:  Vital Signs   T(C): 36.8 (16 Feb 2021 10:15), Max: 36.9 (16 Feb 2021 05:31)  T(F): 98.2 (16 Feb 2021 10:15), Max: 98.4 (16 Feb 2021 05:31)  HR: 81 (16 Feb 2021 10:15) (67 - 84)  BP: 125/77 (16 Feb 2021 10:15) (117/78 - 126/72)  RR: 18 (16 Feb 2021 10:15) (17 - 18)  SpO2: 100% (16 Feb 2021 10:15) (96% - 100%)  General: Elderly male sitting in bed comfortably. No acute distress  HEENT: EOMI. Clear conjunctivae. Moist mucus membrane  Neck: Supple.   Chest: CTA bilaterally. No wheezing, rales or rhonchi. Permacath in right upper chest.   Heart: Normal S1 & S2. RRR.   Abdomen: Soft. Non-tender. Non-distended. + BS  Ext: No pedal edema. No calf tenderness   Neuro: Active and alert. Bell's palsy on left side. No speech disorder  Skin: Warm and Dry  Psychiatry: Normal mood and affect    Time spent: 40 minutes        75 years old male with PMH of ESRD on Dialysis, Bell's palsy, Prostate Cancer and Anemia presented to [facility] ED with confusion. In [facility] ED, pt noted to have left facial droop and code stroke was activated.  Consulted neurology, deem not a candidate for TPA and stroke not likely due to hx of [place] palsy with l facial droop baseline. CT was negative for hemorraghic pathology. Pt admitted for further work up. Nephrology followed pt for CKD requiring HD. Infectious disease consulted and meningitis ruled out. No other source of infection. Neurology followed pt for further assessment including MRI and carotid dopplers. MR brain on 2/13 non diagnostic due to motion artifact, No evidence of acute infarct. Carotid doppler (2/12) showed moderate plaque without a hemodynamic abnormality. Pt evaluated by physical therapy, recommending [facility]. At this time pt is medically stable t be discharges to [facility].     Right lung opacity seen on chest xray. Followed up with CT chest with incidentals finding of 1.4x1.1 cm. Semi solid nodule in RLL. Discussed with pt and wife to repeat chest CT in 3 months and follow up with out pt pulmonology.     Also incidental ascending aortic aneurysm measuring 4.2 cm. Seen by CTS, recommending repeat CT in 6 months.     On CTA chest, noted to have luminal narrowing at the antrum/pylorus of stomach. Recommended Endoscopy as an outpatient.     AMS likely due to hypotension.    PHYSICAL EXAM:  Vital Signs   T(C): 36.8 (16 Feb 2021 10:15), Max: 36.9 (16 Feb 2021 05:31)  T(F): 98.2 (16 Feb 2021 10:15), Max: 98.4 (16 Feb 2021 05:31)  HR: 81 (16 Feb 2021 10:15) (67 - 84)  BP: 125/77 (16 Feb 2021 10:15) (117/78 - 126/72)  RR: 18 (16 Feb 2021 10:15) (17 - 18)  SpO2: 100% (16 Feb 2021 10:15) (96% - 100%)  General: Elderly male sitting in bed comfortably. No acute distress  HEENT: EOMI. Clear conjunctivae. Moist mucus membrane  Neck: Supple.   Chest: CTA bilaterally. No wheezing, rales or rhonchi. Permacath in right upper chest.   Heart: Normal S1 & S2. RRR.   Abdomen: Soft. Non-tender. Non-distended. + BS  Ext: No pedal edema. No calf tenderness   Neuro: Active and alert. Bell's palsy on left side. No speech disorder  Skin: Warm and Dry  Psychiatry: Normal mood and affect    Time spent: 40 minutes

## 2021-02-15 NOTE — DISCHARGE NOTE PROVIDER - CARE PROVIDER_API CALL
Minh Hughes)  Surgery; Thoracic and Cardiac Surgery  [address] [place] [address]  Phone: [phone]  Fax: [phone]  Follow Up Time:     Joshua Bautista ()  Critical Care Medicine; Internal Medicine; Pulmonary Disease  [address], [address]  [place] [address]  Phone: [phone]  Fax: [phone]  Follow Up Time:    Joshua Bautista (DO)  Critical Care Medicine; Internal Medicine; Pulmonary Disease  [address], [address]  [place] [address]  Phone: [phone]  Fax: [phone]  Follow Up Time:     Rosalino Bauer)  Surgery; Thoracic and Cardiac Surgery  [address] [place] [address]  Phone: [phone]  Fax: [phone]  Follow Up Time:     Kb Carter)  Nephrology  [address] [place] [address] [address]  [place] [address]  Phone: [phone]  Fax: [phone]  Follow Up Time:

## 2021-02-15 NOTE — PROGRESS NOTE ADULT - ASSESSMENT
ESRD  AMS  FTT  HTN  Anemia  Hypotension       -HD TTS. Next dialysis to be done on 2/16/21 per schedule; orders placed; reduced UF goal  -Neuro follow up regarding AMS  -EPO as ordered; Hb improving  -BP stable  -Per ID, no evidence of active infection    Thank you

## 2021-02-16 VITALS
WEIGHT: 158.73 LBS | DIASTOLIC BLOOD PRESSURE: 67 MMHG | SYSTOLIC BLOOD PRESSURE: 126 MMHG | RESPIRATION RATE: 18 BRPM | TEMPERATURE: 98 F | HEART RATE: 76 BPM | OXYGEN SATURATION: 99 %

## 2021-02-16 DIAGNOSIS — I71.2 THORACIC AORTIC ANEURYSM, WITHOUT RUPTURE: ICD-10-CM

## 2021-02-16 LAB
ANION GAP SERPL CALC-SCNC: 10 MMOL/L — SIGNIFICANT CHANGE UP (ref 5–17)
BUN SERPL-MCNC: 34 MG/DL — HIGH (ref 8–20)
CALCIUM SERPL-MCNC: 8.5 MG/DL — LOW (ref 8.6–10.2)
CHLORIDE SERPL-SCNC: 96 MMOL/L — LOW (ref 98–107)
CO2 SERPL-SCNC: 29 MMOL/L — SIGNIFICANT CHANGE UP (ref 22–29)
CREAT SERPL-MCNC: 7.37 MG/DL — HIGH (ref 0.5–1.3)
CULTURE RESULTS: SIGNIFICANT CHANGE UP
CULTURE RESULTS: SIGNIFICANT CHANGE UP
GLUCOSE SERPL-MCNC: 96 MG/DL — SIGNIFICANT CHANGE UP (ref 70–99)
HCT VFR BLD CALC: 22.7 % — LOW (ref 39–50)
HGB BLD-MCNC: 7.5 G/DL — LOW (ref 13–17)
MCHC RBC-ENTMCNC: 32.8 PG — SIGNIFICANT CHANGE UP (ref 27–34)
MCHC RBC-ENTMCNC: 33 GM/DL — SIGNIFICANT CHANGE UP (ref 32–36)
MCV RBC AUTO: 99.1 FL — SIGNIFICANT CHANGE UP (ref 80–100)
PHOSPHATE SERPL-MCNC: 2.1 MG/DL — LOW (ref 2.4–4.7)
PLATELET # BLD AUTO: 411 K/UL — HIGH (ref 150–400)
POTASSIUM SERPL-MCNC: 4.3 MMOL/L — SIGNIFICANT CHANGE UP (ref 3.5–5.3)
POTASSIUM SERPL-SCNC: 4.3 MMOL/L — SIGNIFICANT CHANGE UP (ref 3.5–5.3)
RBC # BLD: 2.29 M/UL — LOW (ref 4.2–5.8)
RBC # FLD: 17.3 % — HIGH (ref 10.3–14.5)
SODIUM SERPL-SCNC: 135 MMOL/L — SIGNIFICANT CHANGE UP (ref 135–145)
SPECIMEN SOURCE: SIGNIFICANT CHANGE UP
SPECIMEN SOURCE: SIGNIFICANT CHANGE UP
WBC # BLD: 7.42 K/UL — SIGNIFICANT CHANGE UP (ref 3.8–10.5)
WBC # FLD AUTO: 7.42 K/UL — SIGNIFICANT CHANGE UP (ref 3.8–10.5)

## 2021-02-16 PROCEDURE — 85027 COMPLETE CBC AUTOMATED: CPT

## 2021-02-16 PROCEDURE — 93880 EXTRACRANIAL BILAT STUDY: CPT

## 2021-02-16 PROCEDURE — 97163 PT EVAL HIGH COMPLEX 45 MIN: CPT

## 2021-02-16 PROCEDURE — 82962 GLUCOSE BLOOD TEST: CPT

## 2021-02-16 PROCEDURE — 71250 CT THORAX DX C-: CPT

## 2021-02-16 PROCEDURE — 71045 X-RAY EXAM CHEST 1 VIEW: CPT

## 2021-02-16 PROCEDURE — 85730 THROMBOPLASTIN TIME PARTIAL: CPT

## 2021-02-16 PROCEDURE — 93306 TTE W/DOPPLER COMPLETE: CPT

## 2021-02-16 PROCEDURE — 87070 CULTURE OTHR SPECIMN AEROBIC: CPT

## 2021-02-16 PROCEDURE — 70450 CT HEAD/BRAIN W/O DYE: CPT

## 2021-02-16 PROCEDURE — 86850 RBC ANTIBODY SCREEN: CPT

## 2021-02-16 PROCEDURE — 85045 AUTOMATED RETICULOCYTE COUNT: CPT

## 2021-02-16 PROCEDURE — 71275 CT ANGIOGRAPHY CHEST: CPT

## 2021-02-16 PROCEDURE — 83615 LACTATE (LD) (LDH) ENZYME: CPT

## 2021-02-16 PROCEDURE — 80061 LIPID PANEL: CPT

## 2021-02-16 PROCEDURE — 87529 HSV DNA AMP PROBE: CPT

## 2021-02-16 PROCEDURE — 86901 BLOOD TYPING SEROLOGIC RH(D): CPT

## 2021-02-16 PROCEDURE — 96374 THER/PROPH/DIAG INJ IV PUSH: CPT

## 2021-02-16 PROCEDURE — 80053 COMPREHEN METABOLIC PANEL: CPT

## 2021-02-16 PROCEDURE — 99233 SBSQ HOSP IP/OBS HIGH 50: CPT

## 2021-02-16 PROCEDURE — 85025 COMPLETE CBC W/AUTO DIFF WBC: CPT

## 2021-02-16 PROCEDURE — 87040 BLOOD CULTURE FOR BACTERIA: CPT

## 2021-02-16 PROCEDURE — U0003: CPT

## 2021-02-16 PROCEDURE — 86900 BLOOD TYPING SEROLOGIC ABO: CPT

## 2021-02-16 PROCEDURE — 87205 SMEAR GRAM STAIN: CPT

## 2021-02-16 PROCEDURE — 83540 ASSAY OF IRON: CPT

## 2021-02-16 PROCEDURE — 99291 CRITICAL CARE FIRST HOUR: CPT | Mod: 25

## 2021-02-16 PROCEDURE — 82728 ASSAY OF FERRITIN: CPT

## 2021-02-16 PROCEDURE — 70496 CT ANGIOGRAPHY HEAD: CPT

## 2021-02-16 PROCEDURE — 86769 SARS-COV-2 COVID-19 ANTIBODY: CPT

## 2021-02-16 PROCEDURE — 83550 IRON BINDING TEST: CPT

## 2021-02-16 PROCEDURE — 93005 ELECTROCARDIOGRAM TRACING: CPT

## 2021-02-16 PROCEDURE — 70498 CT ANGIOGRAPHY NECK: CPT

## 2021-02-16 PROCEDURE — 70551 MRI BRAIN STEM W/O DYE: CPT

## 2021-02-16 PROCEDURE — 99239 HOSP IP/OBS DSCHRG MGMT >30: CPT

## 2021-02-16 PROCEDURE — 83036 HEMOGLOBIN GLYCOSYLATED A1C: CPT

## 2021-02-16 PROCEDURE — 85610 PROTHROMBIN TIME: CPT

## 2021-02-16 PROCEDURE — 84466 ASSAY OF TRANSFERRIN: CPT

## 2021-02-16 PROCEDURE — U0005: CPT

## 2021-02-16 PROCEDURE — 99222 1ST HOSP IP/OBS MODERATE 55: CPT

## 2021-02-16 PROCEDURE — 0225U NFCT DS DNA&RNA 21 SARSCOV2: CPT

## 2021-02-16 PROCEDURE — 97167 OT EVAL HIGH COMPLEX 60 MIN: CPT

## 2021-02-16 PROCEDURE — 84484 ASSAY OF TROPONIN QUANT: CPT

## 2021-02-16 PROCEDURE — 99261: CPT

## 2021-02-16 PROCEDURE — 0042T: CPT

## 2021-02-16 PROCEDURE — 84157 ASSAY OF PROTEIN OTHER: CPT

## 2021-02-16 PROCEDURE — 71275 CT ANGIOGRAPHY CHEST: CPT | Mod: 26

## 2021-02-16 PROCEDURE — 87483 CNS DNA AMP PROBE TYPE 12-25: CPT

## 2021-02-16 PROCEDURE — 82945 GLUCOSE OTHER FLUID: CPT

## 2021-02-16 PROCEDURE — 36415 COLL VENOUS BLD VENIPUNCTURE: CPT

## 2021-02-16 PROCEDURE — 89051 BODY FLUID CELL COUNT: CPT

## 2021-02-16 PROCEDURE — 80202 ASSAY OF VANCOMYCIN: CPT

## 2021-02-16 PROCEDURE — 80048 BASIC METABOLIC PNL TOTAL CA: CPT

## 2021-02-16 PROCEDURE — 92610 EVALUATE SWALLOWING FUNCTION: CPT

## 2021-02-16 PROCEDURE — 84100 ASSAY OF PHOSPHORUS: CPT

## 2021-02-16 RX ORDER — CHLORHEXIDINE GLUCONATE 213 G/1000ML
1 SOLUTION TOPICAL DAILY
Refills: 0 | Status: DISCONTINUED | OUTPATIENT
Start: 2021-02-16 | End: 2021-02-16

## 2021-02-16 RX ORDER — ERYTHROPOIETIN 10000 [IU]/ML
10000 INJECTION, SOLUTION INTRAVENOUS; SUBCUTANEOUS
Qty: 0 | Refills: 0 | DISCHARGE

## 2021-02-16 RX ORDER — AMLODIPINE BESYLATE 2.5 MG/1
1 TABLET ORAL
Qty: 0 | Refills: 0 | DISCHARGE
Start: 2021-02-16

## 2021-02-16 RX ADMIN — AMLODIPINE BESYLATE 5 MILLIGRAM(S): 2.5 TABLET ORAL at 05:35

## 2021-02-16 RX ADMIN — HEPARIN SODIUM 5000 UNIT(S): 5000 INJECTION INTRAVENOUS; SUBCUTANEOUS at 10:08

## 2021-02-16 RX ADMIN — ERYTHROPOIETIN 10000 UNIT(S): 10000 INJECTION, SOLUTION INTRAVENOUS; SUBCUTANEOUS at 11:04

## 2021-02-16 RX ADMIN — Medication 1 TABLET(S): at 10:08

## 2021-02-16 RX ADMIN — Medication 1 DROP(S): at 14:25

## 2021-02-16 RX ADMIN — CHLORHEXIDINE GLUCONATE 1 APPLICATION(S): 213 SOLUTION TOPICAL at 10:08

## 2021-02-16 RX ADMIN — Medication 1 DROP(S): at 05:35

## 2021-02-16 NOTE — PROGRESS NOTE ADULT - ASSESSMENT
ESRD  AMS  FTT  HTN  Anemia  Hypotension       -HD TTS. today-Neuro follow up regarding AMS  -EPO as ordered; Hb improving  -BP stable  -Per ID, no evidence of active infection    Thank you

## 2021-02-16 NOTE — PROGRESS NOTE ADULT - PROVIDER SPECIALTY LIST ADULT
Hospitalist
Nephrology
Nephrology
Hospitalist
Hospitalist
Rehab Medicine
Nephrology
Nephrology
Neurology
Neurology
Nephrology
Hospitalist

## 2021-02-16 NOTE — CONSULT NOTE ADULT - SUBJECTIVE AND OBJECTIVE BOX
Surgeon: Juancarlos    Consult requesting by: Darrion    HISTORY OF PRESENT ILLNESS:  75y Male with PMH prostate CA, bells palsy, CKD on HD, BL renal stents.  Presented to the ER 2/11 from outpatient HD center with confusion and hypotension post HD.  CT head and MRI negative for stroke.  Plan was for RENA, but pt Covid-19 + yesterday on discharge surveillance swab.  CTA chest done yesterday and incidental finding of 4.2cm ascending aortic aneurysm.  CT surgery called for evaluation.    PAST MEDICAL & SURGICAL HISTORY:  Prostate CA  as per wife had radiation treatment in 2018.    H/O Bell&#x27;s palsy    Chronic kidney disease (CKD)  on HD as of Novemeber, 2020. had  b/L renal stents in nov, 2020 .    No significant past surgical history    MEDICATIONS  (STANDING):  amLODIPine   Tablet 5 milliGRAM(s) Oral daily  artificial tears (preservative free) Ophthalmic Solution 1 Drop(s) Left EYE four times a day  chlorhexidine 2% Cloths 1 Application(s) Topical daily  epoetin mich-epbx (RETACRIT) Injectable 12713 Unit(s) IV Push <User Schedule>  heparin   Injectable 5000 Unit(s) SubCutaneous every 12 hours  Nephro-chris 1 Tablet(s) Oral daily    MEDICATIONS  (PRN):  acetaminophen   Tablet .. 650 milliGRAM(s) Oral every 6 hours PRN Temp greater or equal to 38C (100.4F), Mild Pain (1 - 3), Moderate Pain (4 - 6)  hydrALAZINE Injectable 10 milliGRAM(s) IV Push every 6 hours PRN If SBP > 160  ondansetron Injectable 4 milliGRAM(s) IV Push every 6 hours PRN Nausea and/or Vomiting    Antiplatelet therapy:                           Last dose/amt:    Allergies    valacyclovir (Unknown)    Intolerances        SOCIAL HISTORY:  Smoker: [ ] Yes  [ ] No        PACK YEARS:                         WHEN QUIT?  ETOH use: [ ] Yes  [ ] No              FREQUENCY / QUANTITY:  Ilicit Drug use:  [ ] Yes  [ ] No  Occupation:  Live with:  Assisted device use:    FAMILY HISTORY:  FH: breast cancer  mother        Review of Systems  CONSTITUTIONAL:  Fevers[ ] chills[ ] sweats[ ] fatigue[ ] weight loss[ ] weight gain [ ]                                     NEGATIVE [x ]   NEURO:  parathesias[ ] seizures [ ]  syncope [ ]  confusion [x ]                                                                                NEGATIVE[ ]   EYES: glasses[ ]  blurry vision[ ]  discharge[ ] pain[ ] glaucoma [ ]                                                                          NEGATIVE[x ]   ENMT:  difficulty hearing [ ]  vertigo[ ]  dysphagia[ ] epistaxis[ ] recent dental work [ ]                                    NEGATIVE[x ]   CV:  chest pain[ ] palpitations[ ] KRAMER [ ] diaphoresis [ ]                                                                                           NEGATIVE[ x]   RESPIRATORY:  wheezing[ ] SOB[ ] cough [ ] sputum[ ] hemoptysis[ ]                                                                  NEGATIVE[x ]   GI:  nausea[ ]  vommiting [ ]  diarrhea[ ] constipation [ ] melena [ ]                                                                      NEGATIVE[x ]   : hematuria[ ]  dysuria[ ] urgency[ ] incontinence[ ]                                                                                            NEGATIVE[x ]   MUSKULOSKELETAL:  arthritis[ ]  joint swelling [ ] muscle weakness [ ]                                                                NEGATIVE[x ]   SKIN/BREAST:  rash[ ] itching [ ]  hair loss[ ] masses[ ]                                                                                              NEGATIVE[x ]   PSYCH:  dementia [ ] depresion [ ] anxiety[ ]                                                                                                               NEGATIVE[x ]   HEME/LYMPH:  bruises easily[ ] enlarged lymph nodes[ ] tender lymph nodes[ ]                                               NEGATIVE[x ]   ENDOCRINE:  cold intolerance[ ] heat intolerance[ ] polydipsia[ ]                                                                          NEGATIVE[x ]     PHYSICAL EXAM  Vital Signs Last 24 Hrs  T(C): 36.8 (16 Feb 2021 10:15), Max: 36.9 (16 Feb 2021 05:31)  T(F): 98.2 (16 Feb 2021 10:15), Max: 98.4 (16 Feb 2021 05:31)  HR: 81 (16 Feb 2021 10:15) (67 - 84)  BP: 125/77 (16 Feb 2021 10:15) (117/78 - 126/72)  BP(mean): --  RR: 18 (16 Feb 2021 10:15) (17 - 18)  SpO2: 100% (16 Feb 2021 10:15) (96% - 100%)    CONSTITUTIONAL:          Physical exam DEFERRED with COVID-19                                                                 Neuro: WNL[ ] Normal exam oriented to person/place & time with no focal motor or sensory  deficits. Other                     Eyes: WNL[ ]   Normal exam of conjunctiva & lids, pupils equally reactive. Other     ENT: WNL[ ]    Normal exam of nasal/oral mucosa with absence of cyanosis. Other  Neck: WNL[ ]  Normal exam of jugular veins, trachea & thyroid. Other  Chest: WNL[ ] Normal lung exam with good air movement absence of wheezes, rales, or rhonchi: Other                                                                                CV:  Auscultation: normal [ ] S3[ ] S4[ ] Irregular [ ] Rub[ ] Clicks[ ]    Murmurs none:[ ]systolic [ ]  diastolic [ ] holosystolic [ ]  Carotids: No Bruits[ ] Other                 Abdominal Aorta: normal [ ] nonpalpable[ ]Other                                                                                      GI:           WNL[ ] Normal exam of abdomen, liver & spleen with no noted masses or tenderness. Other                                                                                                        Extremities: WNL[ ] Normal no evidence of cyanosis or deformity Edema: none[ ]trace[ ]1+[ ]2+[ ]3+[ ]4+[ ]  Lower Extremity Pulses: Right[ ] Left[ ]Varicosities[ ]  SKIN :WNL[ ] Normal exam to inspection & palation. Other:                                                          LABS:                        7.5    7.42  )-----------( 411      ( 16 Feb 2021 10:49 )             22.7     02-16    135  |  96<L>  |  34.0<H>  ----------------------------<  96  4.3   |  29.0  |  7.37<H>    Ca    8.5<L>      16 Feb 2021 10:49  Phos  2.1     02-16      TTE / WILMA: < from: TTE Echo Complete w/o Contrast w/ Doppler (02.12.21 @ 10:00) >    Summary:   1. Normal global left ventricular systolic function.   2. Left ventricular ejection fraction, by visual estimation, is 55 to 60%.   3. Normal right ventricular size and function.   4. Mild thickening of the anterior and posterior mitral valve leaflets.   5. Mild mitral annular calcification.   6. Aortic valve leaflet calcification. There is a small, round echodensity on the aortic valve leaflet which may be consistent with calcification, recommend clinical correlation. No aortic valve stenosis.   7. Dilated aorta at the Sinuses of Valsalva (4 cm; 2.2 cm/m^2). Dilated proximal ascending aorta (3.5 cm; 1.9 cm/m^2).   8. Recommend clinical correlation with the above findings.    Lam Negron MD Electronically signed on 2/12/2021 at 5:33:55 PM    < end of copied text >    < from: CT Angio Chest w/ IV Cont (02.16.21 @ 00:21) >  IMPRESSION:    Ascending thoracic aorta measuring 4.2 cm.    Right lower lobe and left upper lobe groundglass opacities, may reflect postinfectious/inflammatory.  Recommend short interval follow-up chest CT scan in 12 weeks.    Possible thickening, luminal narrowing at the antrum/pylorus of the stomach.  Recommend further clinical correlation and evaluation with endoscopy as indicated.    Other findings as discussed.    This study was preliminary reported by ED radiologist Dr. Álvarez.    JULES LAI MD; Attending Radiologist  This document has been electronically signed. Feb 16 2021 10:49AM    < end of copied text >

## 2021-02-16 NOTE — CONSULT NOTE ADULT - ASSESSMENT
ESRD  AMS  FTT  HTN  Anemia  Hypotension       -HD TTS. Extra HD today s/p CT with IV contrast   -AMS workup per neuro  -Procrit  -Will add Midodrine for BP support if needed    Thank you 
75y Male with PMH prostate CA, bells palsy, CKD on HD, BL renal stents.  Presented to the ER 2/11 from outpatient HD center with confusion and hypotension post HD.  CT head and MRI negative for stroke.  Plan was for RENA, but pt Covid-19 + yesterday on discharge surveillance swab.  CTA chest done yesterday and incidental finding of 4.2cm ascending aortic aneurysm.  CT surgery called for evaluation.    
The patient is a 75y Male who is followed by neurology because of AMS    AMS   hypotension after HD  history of dementia  non focal exam (old finding of facial droop and diffuse weakness though not fully cooperative with exam  I did not recommend alteplase due to mild nondisabling symptoms, dementia and low likelihood of stroke  Was not a candidate for thrombectomy as they had no ischemic penumbra on CTP    Evaluate for infection.  This is not likely a stroke    Thank you for allowing me to participate in the care of your patient    Ankit Jimenez MD, PhD   [id number]      
 This  75y Male who is on HD as of November 2020 and also got renal stents b/L during that period by Dr. Crabtree ( as per pt's wife ) presented with hypotension and  AMS following hemodialysis this afternoon. Pt. completed his dialysis and was waiting to be picked up by his wife when his symptoms of confusion and hypotension developed. During transport to [facility], EMS noticed a left facial droop and code stroke was activated. Later ER physician spoke to pt's wife who stated that pt. had Bell's palsy in December 2020 and his facial droop is from that and not a new finding. I have spoke to pt's wife as well who is at the bedside at the time of admission and told me same thing about his facial droop as well. Pt. was evaluated by neurologist Dr. Jimenez in the ER who thinks it is likely not a stroke and no alteplase was recommended. As per pt's wife he was discharged on Feb, 9th , 2021 from [facility] after he was admitted there for 1 week for generalized weakness and decreased po intake. pt. was discharged with Dronabinol. Pt. has been covid -19 positive. no sick person at home. no fever at home, pt. afebrile in the ED as well. pt. was sedated to get his ct head, got iv contrast and later got LP by ER team and was sedated with ativan and haldol. Pt. sedated at the time of admission. Pt. has not been giving much history as per ER team. There has been no n/v/d. cp, sob per history.   (11 Feb 2021 19:17)    he was worked up for meningitis.   s/p lumbar puncture. CSF fluid with 2 nucleated cell. , CSF PCR for herpes 1/2 specifically, and also CSF PCR for infectious bacterial and viral etiologies (including HSV 1/2) are negative.   patient was given:  acyclovir IVPB   110 mL/Hr (02-11-21 @ 23:02)  cefTRIAXone   IVPB   100 mL/Hr (02-11-21 @ 19:41)    No sick contacts.  he reports that he had a [place] palsy on the left side for some time. wears and eye patch over the left eye.       Impression:  altered mentation  Hypotension  ESRD on dialysis  bells palsy      Plan:  - no evidence of active infection  - hx of hypotension may be due to electrolyte changes, post dialysis    - hx of [place] palsy, still present, not acute.       defer further antibiotics    consider to observe for 24 h      No further specific infectious disease recommendations. Will be available as needed.

## 2021-02-16 NOTE — PROGRESS NOTE ADULT - SUBJECTIVE AND OBJECTIVE BOX
Patient found to have COVID and was aware.   But then took a few steps back and recalled his wife telling him.  He feels weak.  Denies pain.     REVIEW OF SYSTEMS  Constitutional - No fever,  No fatigue  HEENT - No vertigo, No neck pain  Neurological - No headaches, No memory loss, No loss of strength, No numbness, No tremors  Musculoskeletal - No joint pain, No joint swelling, No muscle pain  Psychiatric - No depression, No anxiety    FUNCTIONAL PROGRESS  2/12  Bed Mobility: Rolling/Turning:     · Level of Orange	minimum assist (75% patients effort)  · Physical Assist/Nonphysical Assist	1 person assist    Bed Mobility: Scooting/Bridging:     · Level of Orange	minimum assist (75% patients effort)  · Physical Assist/Nonphysical Assist	1 person assist    Bed Mobility: Sit to Supine:     · Level of Orange	minimum assist (75% patients effort)  · Physical Assist/Nonphysical Assist	1 person assist    Bed Mobility: Supine to Sit:     · Level of Orange	moderate assist (50% patients effort)  · Physical Assist/Nonphysical Assist	1 person assist    Bed Mobility Analysis:     · Bed Mobility Limitations	impaired ability to control trunk for mobility; decreased ability to use legs for bridging/pushing  · Impairments Contributing to Impaired Bed Mobility	impaired balance; impaired postural control; decreased strength; lethargy    Transfer: Sit to Stand:     · Level of Orange	minimum assist (75% patients effort)  · Physical Assist/Nonphysical Assist	1 person assist  · Weight-Bearing Restrictions	weight-bearing as tolerated  · Assistive Device	rolling walker    Transfer: Stand to Sit:     · Level of Orange	minimum assist (75% patients effort)  · Physical Assist/Nonphysical Assist	1 person assist  · Weight-Bearing Restrictions	weight-bearing as tolerated  · Assistive Device	rolling walker    Sit/Stand Transfer Safety Analysis:     · Transfer Safety Concerns Noted	decreased safety awareness; losing balance; decreased weight-shifting ability; decreased sequencing ability  · Impairments Contributing to Impaired Transfers	impaired balance; impaired postural control; decreased strength; impaired coordination; cognition    Gait Skills:     · Level of Orange	minimum assist (75% patients effort)  · Physical Assist/Nonphysical Assist	1 person assist  · Weight-Bearing Restrictions	weight-bearing as tolerated  · Assistive Device	rolling walker  · Gait Distance	3 side steps to left at EOB    Gait Analysis:     · Gait Pattern Used	3-point gait  · Gait Deviations Noted	decreased nick; increased time in double stance; decreased step length; decreased weight-shifting ability  · Impairments Contributing to Gait Deviations	impaired balance; impaired coordination; impaired postural control; decreased strength; cognition    Stair Negotiation:     · Level of Orange	unable to perform; unsafe at this time    Bathing Training:     · Level of Orange	maximum assist (25% patients effort); sponge in sitting  · Physical Assist/Nonphysical Assist	1 person assist; nonverbal cues (demo/gestures); verbal cues; set-up required    Upper Body Dressing Training:     · Level of Orange	maximum assist (25% patients effort); gown in sitting  · Physical Assist/Nonphysical Assist	1 person assist; nonverbal cues (demo/gestures); verbal cues    Lower Body Dressing Training:     · Level of Orange	dependent (less than 25% patients effort); socks in sitting  · Physical Assist/Nonphysical Assist	1 person assist; nonverbal cues (demo/gestures); verbal cues    Toilet Hygiene Training:     · Level of Orange	dependent (less than 25% patients effort); (+) gipson  · Physical Assist/Nonphysical Assist	1 person assist    Grooming Training:     · Level of Orange	maximum assist (25% patients effort); tasks in sitting  · Physical Assist/Nonphysical Assist	1 person assist; nonverbal cues (demo/gestures); verbal cues; set-up required    Eating/Self-Feeding Training:     · Level of Orange	not observed    VITALS  T(C): 36.8 (02-16-21 @ 10:15), Max: 36.9 (02-16-21 @ 05:31)  HR: 81 (02-16-21 @ 10:15) (67 - 84)  BP: 125/77 (02-16-21 @ 10:15) (117/78 - 126/72)  RR: 18 (02-16-21 @ 10:15) (17 - 18)  SpO2: 100% (02-16-21 @ 10:15) (96% - 100%)  Wt(kg): --    MEDICATIONS   acetaminophen   Tablet .. 650 milliGRAM(s) every 6 hours PRN  amLODIPine   Tablet 5 milliGRAM(s) daily  artificial tears (preservative free) Ophthalmic Solution 1 Drop(s) four times a day  chlorhexidine 2% Cloths 1 Application(s) daily  epoetin mich-epbx (RETACRIT) Injectable 82949 Unit(s) <User Schedule>  heparin   Injectable 5000 Unit(s) every 12 hours  hydrALAZINE Injectable 10 milliGRAM(s) every 6 hours PRN  Nephro-chris 1 Tablet(s) daily  ondansetron Injectable 4 milliGRAM(s) every 6 hours PRN      RECENT LABS/IMAGING                          7.5    7.42  )-----------( 411      ( 16 Feb 2021 10:49 )             22.7     02-16    135  |  96<L>  |  34.0<H>  ----------------------------<  96  4.3   |  29.0  |  7.37<H>    Ca    8.5<L>      16 Feb 2021 10:49  Phos  2.1     02-16                  Brain CT 2/11 - Motion degraded exam. No evidence of intracranial hemorrhage or mass effect. If clinical suspicion for acute infarct persists, brain MRI can be obtained if there is no contraindication.    CT perfusion 2/11 - No evidence of core infarct.    CTA neck 2/11 1.  Very limited, motion degraded exam. Portions of the bilateral vertebral arteries and ICAs are not well visualized due to artifact. No gross evidence of vascular occlusion. No evidence of hemodynamically significant stenosis of the bilateral cervical ICAs using NASCET criteria. 2.  1.4 cm nodular opacity in the right lung. Recommend CT chest.    CTA brain 2/11 - Nondiagnostic due to motion.    CAROTID US 2/12 - Moderate plaque without a hemodynamic abnormality.    MRI HEAD 2/13 - Some sequences are nondiagnostic due to motion artifact. No evidence of acute infarct.    CTA CHEST 2/16 - Right lower lobe and left upper lobe groundglass opacities, may reflect postinfectious/inflammatory.  Recommend short interval follow-up chest CT scan in 12 weeks. Possible thickening, luminal narrowing at the antrum/pylorus of the stomach. Recommend further clinical correlation and evaluation with endoscopy as indicated. Other findings as discussed.    ----------------------------------------------------------------------------------------  PHYSICAL EXAM  Constitutional - NAD, Comfortable  HEENT - NCAT, Left eye patch  Extremities - No C/C/E, No calf tenderness   Neurologic Exam -                    Cognitive - Awake, Alert, AAO to self, NOT situation      Communication - +dysarthria     Cranial Nerves - Left peripheral CN 7      Motor - No focal deficits     Sensory - Intact to LT  Psychiatric - Mood stable, Affect Flat  ----------------------------------------------------------------------------------------    ASSESSMENT/PLAN  75yMale with functional deficits related to debility  AMS - Possibly related to hypotension   HTN - Hydralazine, Norvasc  Oral Dysphagia - Puree/Thins  DVT PPX - SCDs, Heparin  Rehab - Patient with no acute CVA. At this time, given diagnosis, recommend RENA, patient DOES NOT meet acute inpatient rehabilitation criteria. Patient needs a more prolonged stay to achieve transition to community.     Continue bedside therapy as well as OOB throughout the day with mobilization by staff to maintain cardiopulmonary function and prevention of secondary complications related to debility.     Discussed with rehab team. 
Patient is a 75y old  Male who presents with a chief complaint of AMS (12 Feb 2021 11:47)       HPI:  pt. is a 75y Male who is on HD as of November 2020 and also got renal stents b/L during that period by Dr. Crabtree ( as per pt's wife ) presented with hypotension and  AMS following hemodialysis this afternoon. Pt. completed his dialysis and was waiting to be picked up by his wife when his symptoms of confusion and hypotension developed. During transport to [facility], EMS noticed a left facial droop and code stroke was activated. Later ER physician spoke to pt's wife who stated that pt. had Bell's palsy in December 2020 and his facial droop is from that and not a new finding. I have spoke to pt's wife as well who is at the bedside at the time of admission and told me same thing about his facial droop as well. Pt. was evaluated by neurologist Dr. Bonilla in the ER who thinks it is likely not a stroke and no alteplase was recommended. As per pt's wife he was discharged on Feb, 9th , 2021 from [facility] after he was admitted there for 1 week for generalized weakness and decreased po intake. pt. was discharged with Dronabinol. Pt. has been covid -19 positive. no sick person at home. no fever at home, pt. afebrile in the ED as well. pt. was sedated to get his ct head, got iv contrast and later got LP by ER team and was sedated with ativan and haldol. Pt. sedated at the time of admission. Pt. has not been giving much history as per ER team. There has been no n/v/d. cp, sob per history.   (11 Feb 2021 19:17) Renal is being consulted for ESRD mangement. He was just recently seen by us in [facility]      Follow up ESRD  No acute events noted    PAST MEDICAL & SURGICAL HISTORY:  Prostate CA  as per wife had radiation treatment in 2018.    H/O Bell&#x27;s palsy    Chronic kidney disease (CKD)  on HD as of Novemeber, 2020. had  b/L renal stents in nov, 2020 .    No significant past surgical history         FAMILY HISTORY:  FH: breast cancer  mother    [place]    Social History:Non smoker    MEDICATIONS  (STANDING):  amLODIPine   Tablet 5 milliGRAM(s) Oral daily  artificial tears (preservative free) Ophthalmic Solution 1 Drop(s) Left EYE four times a day  epoetin mich-epbx (RETACRIT) Injectable 93997 Unit(s) IV Push <User Schedule>  heparin   Injectable 5000 Unit(s) SubCutaneous every 12 hours  Nephro-chris 1 Tablet(s) Oral daily    MEDICATIONS  (PRN):  acetaminophen   Tablet .. 650 milliGRAM(s) Oral every 6 hours PRN Temp greater or equal to 38C (100.4F), Mild Pain (1 - 3), Moderate Pain (4 - 6)  hydrALAZINE Injectable 10 milliGRAM(s) IV Push every 6 hours PRN If SBP > 160  ondansetron Injectable 4 milliGRAM(s) IV Push every 6 hours PRN Nausea and/or Vomiting          Allergies    No Known Allergies    Intolerances         REVIEW OF SYSTEMS:  Some confusion noted      Vital Signs Last 24 Hrs  T(C): 36.6 (15 Feb 2021 05:12), Max: 36.9 (14 Feb 2021 17:41)  T(F): 97.8 (15 Feb 2021 05:12), Max: 98.5 (14 Feb 2021 17:41)  HR: 73 (15 Feb 2021 05:12) (73 - 79)  BP: 142/75 (15 Feb 2021 05:12) (130/81 - 142/75)  BP(mean): --  RR: 18 (15 Feb 2021 05:12) (18 - 18)  SpO2: 96% (15 Feb 2021 05:12) (93% - 99%)    PHYSICAL EXAM:    GENERAL: NAD  HEAD:  Atraumatic, Normocephalic  EYES: EOMI, conjunctiva and sclera clear  ENMT: No Drainage from nares, No drainage from ears  NECK: Supple, neck  veins full  NERVOUS SYSTEM:  Awake and Alert  CHEST/LUNG: Clear to percussion bilaterally; No rales, rhonchi, wheezing, or rubs  HEART: Regular rate and rhythm; No murmurs, rubs, or gallops  ABDOMEN: Soft, Nontender, Nondistended; Bowel sounds present  EXTREMITIES:  No Edema  SKIN: No rashes No obvious ecchymosis      LABS:                                    8.1    6.91  )-----------( 319      ( 13 Feb 2021 11:13 )             24.9     02-13    133<L>  |  95<L>  |  24.0<H>  ----------------------------<  93  4.8   |  28.0  |  5.59<H>    Ca    9.0      13 Feb 2021 11:13  Phos  3.8     02-13            
ESRD on HD  Patient known to us from outpatient  Finished HD today  Stroke Code per ER
Altered mental status    HPI:  pt. is a 75y Male who is on HD as of November 2020 and also got renal stents b/L during that period by Dr. Crabtree ( as per pt's wife ) presented with hypotension and  AMS following hemodialysis this afternoon. Pt. completed his dialysis and was waiting to be picked up by his wife when his symptoms of confusion and hypotension developed. During transport to [facility], EMS noticed a left facial droop and code stroke was activated. Later ER physician spoke to pt's wife who stated that pt. had Bell's palsy in December 2020 and his facial droop is from that and not a new finding. I have spoke to pt's wife as well who is at the bedside at the time of admission and told me same thing about his facial droop as well. Pt. was evaluated by neurologist Dr. Bonilla in the ER who thinks it is likely not a stroke and no alteplase was recommended. As per pt's wife he was discharged on Feb, 9th , 2021 from [facility] after he was admitted there for 1 week for generalized weakness and decreased po intake. pt. was discharged with Dronabinol. Pt. has been covid -19 positive. no sick person at home. no fever at home, pt. afebrile in the ED as well. pt. was sedated to get his ct head, got iv contrast and later got LP by ER team and was sedated with ativan and haldol. Pt. sedated at the time of admission. Pt. has not been giving much history as per ER team. There has been no n/v/d. cp, sob per history.   (11 Feb 2021 19:17)    Interval History:  Patient was seen and examined at bedside around 8:15 am.   Feeling better.    Denies chest pain, palpitations, shortness of breath, headache, dizziness, visual symptoms, nausea, vomiting or abdominal pain.  As per RN, he was confused last night.     ROS:  As per interval history otherwise unremarkable.    PHYSICAL EXAM:  Vital Signs   T(C): 36.4 (14 Feb 2021 04:22), Max: 36.6 (13 Feb 2021 12:30)  T(F): 97.6 (14 Feb 2021 04:22), Max: 97.9 (13 Feb 2021 15:45)  HR: 85 (14 Feb 2021 04:22) (84 - 88)  BP: 160/83 (14 Feb 2021 04:22) (159/97 - 163/7)  RR: 18 (14 Feb 2021 04:22) (18 - 18)  SpO2: 98% (14 Feb 2021 04:22) (98% - 100%)  General: Elderly male sitting in bed comfortably. No acute distress  HEENT: EOMI. Clear conjunctivae. Moist mucus membrane  Neck: Supple.   Chest: CTA bilaterally. No wheezing, rales or rhonchi. Permacath in right upper chest.   Heart: Normal S1 & S2. RRR.   Abdomen: Soft. Non-tender. Non-distended. + BS  Ext: No pedal edema. No calf tenderness   Neuro: Active and alert. Bell's palsy on left side. No speech disorder  Skin: Warm and Dry  Psychiatry: Normal mood and affect    MEDICATIONS  (STANDING):  amLODIPine   Tablet 5 milliGRAM(s) Oral daily  artificial tears (preservative free) Ophthalmic Solution 1 Drop(s) Left EYE four times a day  epoetin mich-epbx (RETACRIT) Injectable 80263 Unit(s) IV Push <User Schedule>  heparin   Injectable 5000 Unit(s) SubCutaneous every 12 hours  Nephro-chris 1 Tablet(s) Oral daily    MEDICATIONS  (PRN):  acetaminophen   Tablet .. 650 milliGRAM(s) Oral every 6 hours PRN Temp greater or equal to 38C (100.4F), Mild Pain (1 - 3), Moderate Pain (4 - 6)  hydrALAZINE Injectable 10 milliGRAM(s) IV Push every 6 hours PRN If SBP > 160  ondansetron Injectable 4 milliGRAM(s) IV Push every 6 hours PRN Nausea and/or Vomiting    LABS:                        8.1    6.91  )-----------( 319      ( 13 Feb 2021 11:13 )             24.9     02-13    133<L>  |  95<L>  |  24.0<H>  ----------------------------<  93  4.8   |  28.0  |  5.59<H>    Ca    9.0      13 Feb 2021 11:13  Phos  3.8     02-13    Blood Culture: 02-11 @ 21:54  Organism --  Gram Stain Blood -- Gram Stain   No polymorphonuclear leukocytes seen  No organisms seen  by cytocentrifuge  Specimen Source .CSF CSF  Culture-Blood --    02-11 @ 20:54  Organism --  Gram Stain Blood -- Gram Stain --  Specimen Source .Blood Blood-Peripheral  Culture-Blood --    02-11 @ 20:53  Organism --  Gram Stain Blood -- Gram Stain --  Specimen Source .Blood Blood-Peripheral  Culture-Blood --    RADIOLOGY & ADDITIONAL STUDIES:  CT Chest No Cont (02.12.21 @ 21:43)  An approximately 1.4 x 1.1 cm semisolid nodule in the right lower lobe is partially obscured by respiratory motion artifact and suboptimal characterize.  Infectious etiology should be excluded clinically.  A follow-up chest CT is indicated in three months to assess for resolution and exclude lesion growth.    Aneurysmal dilatation of the ascending aorta to 4.1-4.3 cm, within limits of pulsation artifact.    MR Head No Cont (02.13.21 @ 17:32)   Some sequences are nondiagnostic due to motion artifact. No evidence of acute infarct.                  
Neurology Progress Note  [facility] [place] [address] [place] [address]  [phone]    Interval History:  No acute overnight events.     Physical Exam:  Vital Signs Last 24 Hrs  T(C): 36.8 (12 Feb 2021 07:21), Max: 36.8 (11 Feb 2021 16:08)  T(F): 98.2 (12 Feb 2021 07:21), Max: 98.2 (11 Feb 2021 16:08)  HR: 91 (12 Feb 2021 07:21) (70 - 96)  BP: 134/86 (12 Feb 2021 07:21) (107/63 - 154/78)  BP(mean): 108 (12 Feb 2021 04:22) (108 - 108)  RR: 18 (12 Feb 2021 07:21) (16 - 20)  SpO2: 97% (12 Feb 2021 07:21) (96% - 100%)  Mental Status: awake, alert, oriented to person and place, unable to state month or year, following commands  CN: PERRL, EOMI, VFF, V1-V3 sensation intact, left facial droop  Motor:  moves all extremities at least 4/5 bilaterally and symmetrically  Sensory: intact to light touch throughout  Coordination: no dysmetria on FTN  Gait: deferred        LABS:  cret                        7.4    7.44  )-----------( 265      ( 12 Feb 2021 03:04 )             22.5     02-12    132<L>  |  93<L>  |  31.0<H>  ----------------------------<  90  4.6   |  29.0  |  5.42<H>    Ca    8.5<L>      12 Feb 2021 03:04    TPro  5.9<L>  /  Alb  2.7<L>  /  TBili  0.4  /  DBili  x   /  AST  17  /  ALT  10  /  AlkPhos  87  02-11    PT/INR - ( 11 Feb 2021 16:24 )   PT: 12.2 sec;   INR: 1.05 ratio         PTT - ( 11 Feb 2021 16:24 )  PTT:31.2 sec    CTH/CTP - no acute findings
Patient is a 75y old  Male who presents with a chief complaint of AMS (12 Feb 2021 11:47)       HPI:  pt. is a 75y Male who is on HD as of November 2020 and also got renal stents b/L during that period by Dr. Crabtree ( as per pt's wife ) presented with hypotension and  AMS following hemodialysis this afternoon. Pt. completed his dialysis and was waiting to be picked up by his wife when his symptoms of confusion and hypotension developed. During transport to [facility], EMS noticed a left facial droop and code stroke was activated. Later ER physician spoke to pt's wife who stated that pt. had Bell's palsy in December 2020 and his facial droop is from that and not a new finding. I have spoke to pt's wife as well who is at the bedside at the time of admission and told me same thing about his facial droop as well. Pt. was evaluated by neurologist Dr. Bonilla in the ER who thinks it is likely not a stroke and no alteplase was recommended. As per pt's wife he was discharged on Feb, 9th , 2021 from [facility] after he was admitted there for 1 week for generalized weakness and decreased po intake. pt. was discharged with Dronabinol. Pt. has been covid -19 positive. no sick person at home. no fever at home, pt. afebrile in the ED as well. pt. was sedated to get his ct head, got iv contrast and later got LP by ER team and was sedated with ativan and haldol. Pt. sedated at the time of admission. Pt. has not been giving much history as per ER team. There has been no n/v/d. cp, sob per history.   (11 Feb 2021 19:17) Renal is being consulted for ESRD mangement. He was just recently seen by us in [facility]      Follow up ESRD  No acute events noted    PAST MEDICAL & SURGICAL HISTORY:  Prostate CA  as per wife had radiation treatment in 2018.    H/O Bell&#x27;s palsy    Chronic kidney disease (CKD)  on HD as of Novemeber, 2020. had  b/L renal stents in nov, 2020 .    No significant past surgical history         FAMILY HISTORY:  FH: breast cancer  mother    [place]    Social History:Non smoker    MEDICATIONS  (STANDING):  artificial tears (preservative free) Ophthalmic Solution 1 Drop(s) Left EYE four times a day  epoetin mich-epbx (RETACRIT) Injectable 20330 Unit(s) IV Push <User Schedule>  heparin   Injectable 5000 Unit(s) SubCutaneous every 12 hours  Nephro-chris 1 Tablet(s) Oral daily    MEDICATIONS  (PRN):  acetaminophen   Tablet .. 650 milliGRAM(s) Oral every 6 hours PRN Temp greater or equal to 38C (100.4F), Mild Pain (1 - 3), Moderate Pain (4 - 6)  hydrALAZINE Injectable 10 milliGRAM(s) IV Push every 6 hours PRN If SBP > 160  ondansetron Injectable 4 milliGRAM(s) IV Push every 6 hours PRN Nausea and/or Vomiting      Allergies    No Known Allergies    Intolerances         REVIEW OF SYSTEMS:    Review of Systems:  As per HPI, otherwise no other history available       Vital Signs Last 24 Hrs  T(C): 37 (12 Feb 2021 20:03), Max: 37.1 (12 Feb 2021 16:14)  T(F): 98.6 (12 Feb 2021 20:03), Max: 98.8 (12 Feb 2021 16:14)  HR: 77 (12 Feb 2021 20:03) (67 - 91)  BP: 157/92 (12 Feb 2021 20:03) (128/109 - 157/92)  BP(mean): 119 (12 Feb 2021 12:00) (119 - 119)  RR: 16 (12 Feb 2021 20:03) (16 - 18)  SpO2: 97% (12 Feb 2021 20:03) (97% - 100%)    PHYSICAL EXAM:    GENERAL: NAD  HEAD:  Atraumatic, Normocephalic  EYES: EOMI, conjunctiva and sclera clear  ENMT: No Drainage from nares, No drainage from ears  NECK: Supple, neck  veins full  NERVOUS SYSTEM:  Awake and Alert  CHEST/LUNG: Clear to percussion bilaterally; No rales, rhonchi, wheezing, or rubs  HEART: Regular rate and rhythm; No murmurs, rubs, or gallops  ABDOMEN: Soft, Nontender, Nondistended; Bowel sounds present  EXTREMITIES:  No Edema  SKIN: No rashes No obvious ecchymosis      LABS:     2/13/21: pending                       7.4    7.44  )-----------( 265      ( 12 Feb 2021 03:04 )             22.5     02-12    132<L>  |  93<L>  |  31.0<H>  ----------------------------<  90  4.6   |  29.0  |  5.42<H>    Ca    8.5<L>      12 Feb 2021 03:04    TPro  5.9<L>  /  Alb  2.7<L>  /  TBili  0.4  /  DBili  x   /  AST  17  /  ALT  10  /  AlkPhos  87  02-11    PT/INR - ( 11 Feb 2021 16:24 )   PT: 12.2 sec;   INR: 1.05 ratio         PTT - ( 11 Feb 2021 16:24 )  PTT:31.2 sec            RADIOLOGY & ADDITIONAL TESTS:  
Patient is a 75y old  Male who presents with a chief complaint of AMS (12 Feb 2021 11:47)       HPI:  pt. is a 75y Male who is on HD as of November 2020 and also got renal stents b/L during that period by Dr. Crabtree ( as per pt's wife ) presented with hypotension and  AMS following hemodialysis this afternoon. Pt. completed his dialysis and was waiting to be picked up by his wife when his symptoms of confusion and hypotension developed. During transport to [facility], EMS noticed a left facial droop and code stroke was activated. Later ER physician spoke to pt's wife who stated that pt. had Bell's palsy in December 2020 and his facial droop is from that and not a new finding. I have spoke to pt's wife as well who is at the bedside at the time of admission and told me same thing about his facial droop as well. Pt. was evaluated by neurologist Dr. Bonilla in the ER who thinks it is likely not a stroke and no alteplase was recommended. As per pt's wife he was discharged on Feb, 9th , 2021 from [facility] after he was admitted there for 1 week for generalized weakness and decreased po intake. pt. was discharged with Dronabinol. Pt. has been covid -19 positive. no sick person at home. no fever at home, pt. afebrile in the ED as well. pt. was sedated to get his ct head, got iv contrast and later got LP by ER team and was sedated with ativan and haldol. Pt. sedated at the time of admission. Pt. has not been giving much history as per ER team. There has been no n/v/d. cp, sob per history.   (11 Feb 2021 19:17) Renal is being consulted for ESRD mangement. He was just recently seen by us in [facility]      Follow up ESRD  No acute events noted    PAST MEDICAL & SURGICAL HISTORY:  Prostate CA  as per wife had radiation treatment in 2018.    H/O Bell&#x27;s palsy    Chronic kidney disease (CKD)  on HD as of Novemeber, 2020. had  b/L renal stents in nov, 2020 .    No significant past surgical history         FAMILY HISTORY:  FH: breast cancer  mother    [place]    Social History:Non smoker    MEDICATIONS  (STANDING):  amLODIPine   Tablet 5 milliGRAM(s) Oral daily  artificial tears (preservative free) Ophthalmic Solution 1 Drop(s) Left EYE four times a day  epoetin mich-epbx (RETACRIT) Injectable 32462 Unit(s) IV Push <User Schedule>  heparin   Injectable 5000 Unit(s) SubCutaneous every 12 hours  Nephro-chris 1 Tablet(s) Oral daily    MEDICATIONS  (PRN):  acetaminophen   Tablet .. 650 milliGRAM(s) Oral every 6 hours PRN Temp greater or equal to 38C (100.4F), Mild Pain (1 - 3), Moderate Pain (4 - 6)  hydrALAZINE Injectable 10 milliGRAM(s) IV Push every 6 hours PRN If SBP > 160  ondansetron Injectable 4 milliGRAM(s) IV Push every 6 hours PRN Nausea and/or Vomiting          Allergies    No Known Allergies    Intolerances         REVIEW OF SYSTEMS:  Some confusion noted      ICU Vital Signs Last 24 Hrs  T(C): 36.7 (16 Feb 2021 13:25), Max: 36.9 (16 Feb 2021 05:31)  T(F): 98.1 (16 Feb 2021 13:25), Max: 98.4 (16 Feb 2021 05:31)  HR: 76 (16 Feb 2021 13:25) (76 - 83)  BP: 126/67 (16 Feb 2021 13:25) (122/74 - 126/72)  BP(mean): --  ABP: --  ABP(mean): --  RR: 18 (16 Feb 2021 13:25) (18 - 18)  SpO2: 99% (16 Feb 2021 13:25) (96% - 100%)    PHYSICAL EXAM:    GENERAL: NAD  HEAD:  Atraumatic, Normocephalic  EYES: EOMI, conjunctiva and sclera clear  ENMT: No Drainage from nares, No drainage from ears  NECK: Supple, neck  veins full  NERVOUS SYSTEM:  Awake and Alert  CHEST/LUNG: Clear to percussion bilaterally; No rales, rhonchi, wheezing, or rubs  HEART: Regular rate and rhythm; No murmurs, rubs, or gallops  ABDOMEN: Soft, Nontender, Nondistended; Bowel sounds present  EXTREMITIES:  No Edema  SKIN: No rashes No obvious ecchymosis      LABS:                             7.5    7.42  )-----------( 411      ( 16 Feb 2021 10:49 )             22.7     02-16    135  |  96<L>  |  34.0<H>  ----------------------------<  96  4.3   |  29.0  |  7.37<H>    Ca    8.5<L>      16 Feb 2021 10:49  Phos  2.1     02-16                      
Altered mental status    HPI:  pt. is a 75y Male who is on HD as of November 2020 and also got renal stents b/L during that period by Dr. Crabtree ( as per pt's wife ) presented with hypotension and  AMS following hemodialysis this afternoon. Pt. completed his dialysis and was waiting to be picked up by his wife when his symptoms of confusion and hypotension developed. During transport to [facility], EMS noticed a left facial droop and code stroke was activated. Later ER physician spoke to pt's wife who stated that pt. had Bell's palsy in December 2020 and his facial droop is from that and not a new finding. I have spoke to pt's wife as well who is at the bedside at the time of admission and told me same thing about his facial droop as well. Pt. was evaluated by neurologist Dr. Bonilla in the ER who thinks it is likely not a stroke and no alteplase was recommended. As per pt's wife he was discharged on Feb, 9th , 2021 from [facility] after he was admitted there for 1 week for generalized weakness and decreased po intake. pt. was discharged with Dronabinol. Pt. has been covid -19 positive. no sick person at home. no fever at home, pt. afebrile in the ED as well. pt. was sedated to get his ct head, got iv contrast and later got LP by ER team and was sedated with ativan and haldol. Pt. sedated at the time of admission. Pt. has not been giving much history as per ER team. There has been no n/v/d. cp, sob per history.   (11 Feb 2021 19:17)    Interval History:  Patient was seen and examined at bedside around 9 am.   Doing well.   Denies chest pain, palpitations, shortness of breath, cough, headache, dizziness, visual symptoms, nausea, vomiting or abdominal pain.    ROS:  As per interval history otherwise unremarkable.    PHYSICAL EXAM:  Vital Signs   T(C): 36.6 (15 Feb 2021 10:49), Max: 36.9 (14 Feb 2021 17:41)  T(F): 97.9 (15 Feb 2021 10:49), Max: 98.5 (14 Feb 2021 17:41)  HR: 77 (15 Feb 2021 10:49) (73 - 79)  BP: 132/74 (15 Feb 2021 10:49) (130/81 - 142/75)  RR: 18 (15 Feb 2021 10:49) (18 - 18)  SpO2: 97% (15 Feb 2021 10:49) (93% - 97%)  General: Elderly male sitting in bed comfortably. No acute distress  HEENT: EOMI. Clear conjunctivae. Moist mucus membrane  Neck: Supple.   Chest: CTA bilaterally. No wheezing, rales or rhonchi. Permacath in right upper chest.   Heart: Normal S1 & S2. RRR.   Abdomen: Soft. Non-tender. Non-distended. + BS  Ext: No pedal edema. No calf tenderness   Neuro: Active and alert. Bell's palsy on left side. No speech disorder  Skin: Warm and Dry  Psychiatry: Normal mood and affect    MEDICATIONS  (STANDING):  amLODIPine   Tablet 5 milliGRAM(s) Oral daily  artificial tears (preservative free) Ophthalmic Solution 1 Drop(s) Left EYE four times a day  epoetin mich-epbx (RETACRIT) Injectable 34711 Unit(s) IV Push <User Schedule>  heparin   Injectable 5000 Unit(s) SubCutaneous every 12 hours  Nephro-chris 1 Tablet(s) Oral daily    MEDICATIONS  (PRN):  acetaminophen   Tablet .. 650 milliGRAM(s) Oral every 6 hours PRN Temp greater or equal to 38C (100.4F), Mild Pain (1 - 3), Moderate Pain (4 - 6)  hydrALAZINE Injectable 10 milliGRAM(s) IV Push every 6 hours PRN If SBP > 160  ondansetron Injectable 4 milliGRAM(s) IV Push every 6 hours PRN Nausea and/or Vomiting    LABS:                        7.7    7.18  )-----------( 418      ( 15 Feb 2021 09:26 )             24.0     02-15    136  |  96<L>  |  24.0<H>  ----------------------------<  76  4.4   |  28.0  |  6.56<H>    Ca    9.1      15 Feb 2021 09:26  Phos  2.3     02-15    Blood Culture: 02-11 @ 21:54  Organism --  Gram Stain Blood -- Gram Stain   No polymorphonuclear leukocytes seen  No organisms seen  by cytocentrifuge  Specimen Source .CSF CSF  Culture-Blood --    02-11 @ 20:54  Organism --  Gram Stain Blood -- Gram Stain --  Specimen Source .Blood Blood-Peripheral  Culture-Blood --    02-11 @ 20:53  Organism --  Gram Stain Blood -- Gram Stain --  Specimen Source .Blood Blood-Peripheral  Culture-Blood --    RADIOLOGY & ADDITIONAL STUDIES:  CT Chest No Cont (02.12.21 @ 21:43)  An approximately 1.4 x 1.1 cm semisolid nodule in the right lower lobe is partially obscured by respiratory motion artifact and suboptimal characterize.  Infectious etiology should be excluded clinically.  A follow-up chest CT is indicated in three months to assess for resolution and exclude lesion growth.    Aneurysmal dilatation of the ascending aorta to 4.1-4.3 cm, within limits of pulsation artifact.    MR Head No Cont (02.13.21 @ 17:32)   Some sequences are nondiagnostic due to motion artifact. No evidence of acute infarct.                  
Altered mental status    HPI:  pt. is a 75y Male who is on HD as of November 2020 and also got renal stents b/L during that period by Dr. Crabtree ( as per pt's wife ) presented with hypotension and  AMS following hemodialysis this afternoon. Pt. completed his dialysis and was waiting to be picked up by his wife when his symptoms of confusion and hypotension developed. During transport to [facility], EMS noticed a left facial droop and code stroke was activated. Later ER physician spoke to pt's wife who stated that pt. had Bell's palsy in December 2020 and his facial droop is from that and not a new finding. I have spoke to pt's wife as well who is at the bedside at the time of admission and told me same thing about his facial droop as well. Pt. was evaluated by neurologist Dr. Bonilla in the ER who thinks it is likely not a stroke and no alteplase was recommended. As per pt's wife he was discharged on Feb, 9th , 2021 from [facility] after he was admitted there for 1 week for generalized weakness and decreased po intake. pt. was discharged with Dronabinol. Pt. has been covid -19 positive. no sick person at home. no fever at home, pt. afebrile in the ED as well. pt. was sedated to get his ct head, got iv contrast and later got LP by ER team and was sedated with ativan and haldol. Pt. sedated at the time of admission. Pt. has not been giving much history as per ER team. There has been no n/v/d. cp, sob per history.   (11 Feb 2021 19:17)    Interval History:  Patient was seen and examined at bedside around 8:30 am. More alert and awake today.   Denies chest pain, palpitations, shortness of breath, headache, dizziness, visual symptoms, nausea, vomiting or abdominal pain.    ROS:  As per interval history otherwise unremarkable.    PHYSICAL EXAM:  Vital Signs   T(C): 36.6 (13 Feb 2021 12:30), Max: 37.1 (12 Feb 2021 16:14)  T(F): 97.8 (13 Feb 2021 12:30), Max: 98.8 (12 Feb 2021 16:14)  HR: 84 (13 Feb 2021 12:30) (67 - 84)  BP: 159/97 (13 Feb 2021 12:30) (129/74 - 159/97)  RR: 18 (13 Feb 2021 12:30) (16 - 18)  SpO2: 100% (13 Feb 2021 12:30) (97% - 100%)  General: Elderly male lying in bed comfortably. No acute distress  HEENT: EOMI. Clear conjunctivae. Moist mucus membrane  Neck: Supple.   Chest: CTA bilaterally. No wheezing, rales or rhonchi. Permacath in right upper chest.   Heart: Normal S1 & S2. RRR.   Abdomen: Soft. Non-tender. Non-distended. + BS  Ext: No pedal edema. No calf tenderness   Neuro: Active and alert but not oriented to time, place and person. Bell's palsy on left side. No speech disorder  Skin: Warm and Dry  Psychiatry: Normal mood and affect    MEDICATIONS  (STANDING):  artificial tears (preservative free) Ophthalmic Solution 1 Drop(s) Left EYE four times a day  epoetin mich-epbx (RETACRIT) Injectable 19553 Unit(s) IV Push <User Schedule>  heparin   Injectable 5000 Unit(s) SubCutaneous every 12 hours  Nephro-chris 1 Tablet(s) Oral daily    MEDICATIONS  (PRN):  acetaminophen   Tablet .. 650 milliGRAM(s) Oral every 6 hours PRN Temp greater or equal to 38C (100.4F), Mild Pain (1 - 3), Moderate Pain (4 - 6)  hydrALAZINE Injectable 10 milliGRAM(s) IV Push every 6 hours PRN If SBP > 160  ondansetron Injectable 4 milliGRAM(s) IV Push every 6 hours PRN Nausea and/or Vomiting    LABS:                        8.1    6.91  )-----------( 319      ( 13 Feb 2021 11:13 )             24.9     02-13    133<L>  |  95<L>  |  24.0<H>  ----------------------------<  93  4.8   |  28.0  |  5.59<H>    Ca    9.0      13 Feb 2021 11:13  Phos  3.8     02-13    TPro  5.9<L>  /  Alb  2.7<L>  /  TBili  0.4  /  DBili  x   /  AST  17  /  ALT  10  /  AlkPhos  87  02-11    PT/INR - ( 11 Feb 2021 16:24 )   PT: 12.2 sec;   INR: 1.05 ratio         PTT - ( 11 Feb 2021 16:24 )  PTT:31.2 sec  CARDIAC MARKERS ( 12 Feb 2021 03:04 )  x     / 0.08 ng/mL / x     / x     / x      CARDIAC MARKERS ( 11 Feb 2021 16:24 )  x     / 0.08 ng/mL / x     / x     / x        Blood Culture: 02-11 @ 21:54  Organism --  Gram Stain Blood -- Gram Stain   No polymorphonuclear leukocytes seen  No organisms seen  by cytocentrifuge  Specimen Source .CSF CSF  Culture-Blood --    RADIOLOGY & ADDITIONAL STUDIES:  CT Chest No Cont (02.12.21 @ 21:43)  An approximately 1.4 x 1.1 cm semisolid nodule in the right lower lobe is partially obscured by respiratory motion artifact and suboptimal characterize.  Infectious etiology should be excluded clinically.  A follow-up chest CT is indicated in three months to assess for resolution and exclude lesion growth.    Aneurysmal dilatation of the ascending aorta to 4.1-4.3 cm, within limits of pulsation artifact.              
Patient is a 75y old  Male who presents with a chief complaint of AMS (12 Feb 2021 11:47)       HPI:  pt. is a 75y Male who is on HD as of November 2020 and also got renal stents b/L during that period by Dr. Crabtree ( as per pt's wife ) presented with hypotension and  AMS following hemodialysis this afternoon. Pt. completed his dialysis and was waiting to be picked up by his wife when his symptoms of confusion and hypotension developed. During transport to [facility], EMS noticed a left facial droop and code stroke was activated. Later ER physician spoke to pt's wife who stated that pt. had Bell's palsy in December 2020 and his facial droop is from that and not a new finding. I have spoke to pt's wife as well who is at the bedside at the time of admission and told me same thing about his facial droop as well. Pt. was evaluated by neurologist Dr. Bonilla in the ER who thinks it is likely not a stroke and no alteplase was recommended. As per pt's wife he was discharged on Feb, 9th , 2021 from [facility] after he was admitted there for 1 week for generalized weakness and decreased po intake. pt. was discharged with Dronabinol. Pt. has been covid -19 positive. no sick person at home. no fever at home, pt. afebrile in the ED as well. pt. was sedated to get his ct head, got iv contrast and later got LP by ER team and was sedated with ativan and haldol. Pt. sedated at the time of admission. Pt. has not been giving much history as per ER team. There has been no n/v/d. cp, sob per history.   (11 Feb 2021 19:17) Renal is being consulted for ESRD mangement. He was just recently seen by us in [facility]      Follow up ESRD  No acute events noted    PAST MEDICAL & SURGICAL HISTORY:  Prostate CA  as per wife had radiation treatment in 2018.    H/O Bell&#x27;s palsy    Chronic kidney disease (CKD)  on HD as of Novemeber, 2020. had  b/L renal stents in nov, 2020 .    No significant past surgical history         FAMILY HISTORY:  FH: breast cancer  mother    [place]    Social History:Non smoker    MEDICATIONS  (STANDING):  artificial tears (preservative free) Ophthalmic Solution 1 Drop(s) Left EYE four times a day  epoetin mich-epbx (RETACRIT) Injectable 68735 Unit(s) IV Push <User Schedule>  heparin   Injectable 5000 Unit(s) SubCutaneous every 12 hours  Nephro-chris 1 Tablet(s) Oral daily    MEDICATIONS  (PRN):  acetaminophen   Tablet .. 650 milliGRAM(s) Oral every 6 hours PRN Temp greater or equal to 38C (100.4F), Mild Pain (1 - 3), Moderate Pain (4 - 6)  hydrALAZINE Injectable 10 milliGRAM(s) IV Push every 6 hours PRN If SBP > 160  ondansetron Injectable 4 milliGRAM(s) IV Push every 6 hours PRN Nausea and/or Vomiting        Allergies    No Known Allergies    Intolerances         REVIEW OF SYSTEMS:    Review of Systems:  As per HPI, otherwise no other history available       Vital Signs Last 24 Hrs  T(C): 36.4 (14 Feb 2021 04:22), Max: 36.9 (13 Feb 2021 05:58)  T(F): 97.6 (14 Feb 2021 04:22), Max: 98.5 (13 Feb 2021 05:58)  HR: 85 (14 Feb 2021 04:22) (79 - 88)  BP: 160/83 (14 Feb 2021 04:22) (138/93 - 163/7)  BP(mean): --  RR: 18 (14 Feb 2021 04:22) (16 - 18)  SpO2: 98% (14 Feb 2021 04:22) (98% - 100%)    PHYSICAL EXAM:    GENERAL: NAD  HEAD:  Atraumatic, Normocephalic  EYES: EOMI, conjunctiva and sclera clear  ENMT: No Drainage from nares, No drainage from ears  NECK: Supple, neck  veins full  NERVOUS SYSTEM:  Awake and Alert  CHEST/LUNG: Clear to percussion bilaterally; No rales, rhonchi, wheezing, or rubs  HEART: Regular rate and rhythm; No murmurs, rubs, or gallops  ABDOMEN: Soft, Nontender, Nondistended; Bowel sounds present  EXTREMITIES:  No Edema  SKIN: No rashes No obvious ecchymosis      LABS:                           8.1    6.91  )-----------( 319      ( 13 Feb 2021 11:13 )             24.9     02-13    133<L>  |  95<L>  |  24.0<H>  ----------------------------<  93  4.8   |  28.0  |  5.59<H>    Ca    9.0      13 Feb 2021 11:13  Phos  3.8     02-13                  RADIOLOGY & ADDITIONAL TESTS:  
Altered mental status    HPI:  pt. is a 75y Male who is on HD as of November 2020 and also got renal stents b/L during that period by Dr. Crabtree ( as per pt's wife ) presented with hypotension and  AMS following hemodialysis this afternoon. Pt. completed his dialysis and was waiting to be picked up by his wife when his symptoms of confusion and hypotension developed. During transport to [facility], EMS noticed a left facial droop and code stroke was activated. Later ER physician spoke to pt's wife who stated that pt. had Bell's palsy in December 2020 and his facial droop is from that and not a new finding. I have spoke to pt's wife as well who is at the bedside at the time of admission and told me same thing about his facial droop as well. Pt. was evaluated by neurologist Dr. Bonilla in the ER who thinks it is likely not a stroke and no alteplase was recommended. As per pt's wife he was discharged on Feb, 9th , 2021 from [facility] after he was admitted there for 1 week for generalized weakness and decreased po intake. pt. was discharged with Dronabinol. Pt. has been covid -19 positive. no sick person at home. no fever at home, pt. afebrile in the ED as well. pt. was sedated to get his ct head, got iv contrast and later got LP by ER team and was sedated with ativan and haldol. Pt. sedated at the time of admission. Pt. has not been giving much history as per ER team. There has been no n/v/d. cp, sob per history.   (11 Feb 2021 19:17)    Interval History:  Patient was seen and examined at bedside. Feels OK.   Denies chest pain, palpitations, shortness of breath, headache, dizziness, visual symptoms, nausea, vomiting or abdominal pain.  Not oriented to time, place and person.     ROS:  As per interval history otherwise unremarkable.    PHYSICAL EXAM:  Vital Signs   T(C): 36.8 (12 Feb 2021 07:21), Max: 36.8 (11 Feb 2021 16:08)  T(F): 98.2 (12 Feb 2021 07:21), Max: 98.2 (11 Feb 2021 16:08)  HR: 91 (12 Feb 2021 07:21) (70 - 96)  BP: 134/86 (12 Feb 2021 07:21) (107/63 - 154/78)  BP(mean): 108 (12 Feb 2021 04:22) (108 - 108)  RR: 18 (12 Feb 2021 07:21) (16 - 20)  SpO2: 97% (12 Feb 2021 07:21) (96% - 100%)  General: Elderly male lying in bed comfortably. No acute distress  HEENT: EOMI. Patch on left eye. Clear conjunctivae. Moist mucus membrane  Neck: Supple.   Chest: CTA bilaterally. No wheezing, rales or rhonchi. Permacath in right upper chest.   Heart: Normal S1 & S2. RRR.   Abdomen: Soft. Non-tender. Non-distended. + BS  Ext: No pedal edema. No calf tenderness   Neuro: Active and alert but not oriented to time, place and person. Bell's palsy on left side. No speech disorder  Skin: Warm and Dry  Psychiatry: Normal mood and affect    MEDICATIONS  (STANDING):  artificial tears (preservative free) Ophthalmic Solution 1 Drop(s) Left EYE four times a day  heparin   Injectable 5000 Unit(s) SubCutaneous every 12 hours    MEDICATIONS  (PRN):  hydrALAZINE Injectable 5 milliGRAM(s) IV Push every 8 hours PRN for sbp above 160 and dbp above 100.    LABS:  CAPILLARY BLOOD GLUCOSE  POCT Blood Glucose.: 89 mg/dL (11 Feb 2021 16:09)                        7.4    7.44  )-----------( 265      ( 12 Feb 2021 03:04 )             22.5     02-12    132<L>  |  93<L>  |  31.0<H>  ----------------------------<  90  4.6   |  29.0  |  5.42<H>    Ca    8.5<L>      12 Feb 2021 03:04    TPro  5.9<L>  /  Alb  2.7<L>  /  TBili  0.4  /  DBili  x   /  AST  17  /  ALT  10  /  AlkPhos  87  02-11    PT/INR - ( 11 Feb 2021 16:24 )   PT: 12.2 sec;   INR: 1.05 ratio       PTT - ( 11 Feb 2021 16:24 )  PTT:31.2 sec    RADIOLOGY & ADDITIONAL STUDIES:  Reviewed       
Neurology Progress Note  [facility] at [address] [address]  [phone]    Interval History:  No acute overnight events.     Physical Exam:  Vital Signs Last 24 Hrs  T(C): 36.4 (14 Feb 2021 11:48), Max: 36.4 (14 Feb 2021 04:22)  T(F): 97.5 (14 Feb 2021 11:48), Max: 97.6 (14 Feb 2021 04:22)  HR: 77 (14 Feb 2021 11:48) (77 - 85)  BP: 141/86 (14 Feb 2021 11:48) (141/86 - 160/83)  BP(mean): --  RR: 18 (14 Feb 2021 11:48) (18 - 18)  SpO2: 99% (14 Feb 2021 11:48) (98% - 99%)  Mental Status: awake, alert, oriented to person and place, unable to state month or year, following commands  CN: PERRL, EOMI, VFF, V1-V3 sensation intact, left facial droop  Motor:  moves all extremities at least 4/5 bilaterally and symmetrically  Sensory: intact to light touch throughout  Coordination: no dysmetria on FTN  Gait: deferred    MEDICATIONS  (STANDING):  amLODIPine   Tablet 5 milliGRAM(s) Oral daily  artificial tears (preservative free) Ophthalmic Solution 1 Drop(s) Left EYE four times a day  epoetin mich-epbx (RETACRIT) Injectable 67791 Unit(s) IV Push <User Schedule>  heparin   Injectable 5000 Unit(s) SubCutaneous every 12 hours  Nephro-chris 1 Tablet(s) Oral daily    MEDICATIONS  (PRN):  acetaminophen   Tablet .. 650 milliGRAM(s) Oral every 6 hours PRN Temp greater or equal to 38C (100.4F), Mild Pain (1 - 3), Moderate Pain (4 - 6)  hydrALAZINE Injectable 10 milliGRAM(s) IV Push every 6 hours PRN If SBP > 160  ondansetron Injectable 4 milliGRAM(s) IV Push every 6 hours PRN Nausea and/or Vomiting      LABS:  cret                        8.1    6.91  )-----------( 319      ( 13 Feb 2021 11:13 )             24.9     02-13    133<L>  |  95<L>  |  24.0<H>  ----------------------------<  93  4.8   |  28.0  |  5.59<H>    Ca    9.0      13 Feb 2021 11:13  Phos  3.8     02-13      MRI brain - unremarkable  Carotid US- unremarkable

## 2021-02-16 NOTE — CONSULT NOTE ADULT - PROBLEM SELECTOR RECOMMENDATION 9
Incidental finding of 4.2cm ascending aortic aneursym  TTE done.  Recommend 6 month follow up in office and repeat CT scan.   Discussed with Dr. Bauer. Please reconsult as necessary.

## 2021-02-16 NOTE — CDI QUERY NOTE - NSCDIOTHERTXTBX_GEN_ALL_CORE_HH
ED- Pt was ROBERTO c/o hypotension and AMS after completing dialysis.  discharged from [facility] after a week stay c/o weakness and not eating for a month.  She states that he seems to be continually declining.  He completed dialysis today and she states that she was unable to recognize her  Principal Discharge DX:	AMS (altered mental status)  Secondary Diagnosis:	Hypotension.    H&P- ·  Problem: Altered mental status, unspecified altered mental status type.  Plan: Etiology ? TIA ? will admit to stroke unit for now    2/12 Neuro- 76 yo man with hx of dementia with AMS during dialysis likely due to hypotension vs toxic metabolic encephalopathy.  Altered mental status, unspecified altered mental status type.  Plan: MRI brain w/o contrast and carotid dopplers pending  R/o underlying infection  Correct metabolic abnormalities    2/15 Hospitalist- 75 years old male with PMH of ESRD on Dialysis, Prostate Cancer and Anemia presented with confusion.     1) AMS  - Hypotensive after HD  - No signs of active infection   - MRI Brain with no acute findings     Please specify the likely etiology of AMS    1) AMS due to metabolic encephalopathy  2) AMS due to hypotension  3) Other ( please specify)  4) Unknown etiology

## 2021-04-27 ENCOUNTER — NON-APPOINTMENT (OUTPATIENT)
Age: 76
End: 2021-04-27
